# Patient Record
Sex: MALE | Race: WHITE
[De-identification: names, ages, dates, MRNs, and addresses within clinical notes are randomized per-mention and may not be internally consistent; named-entity substitution may affect disease eponyms.]

---

## 2017-02-09 ENCOUNTER — HOSPITAL ENCOUNTER (OUTPATIENT)
Dept: HOSPITAL 17 - HDIC | Age: 57
Discharge: HOME | End: 2017-02-09
Attending: SURGERY
Payer: MEDICAID

## 2017-02-09 VITALS
HEART RATE: 55 BPM | RESPIRATION RATE: 18 BRPM | SYSTOLIC BLOOD PRESSURE: 137 MMHG | OXYGEN SATURATION: 96 % | TEMPERATURE: 98.5 F | DIASTOLIC BLOOD PRESSURE: 84 MMHG

## 2017-02-09 VITALS — BODY MASS INDEX: 25.5 KG/M2 | WEIGHT: 188.27 LBS | HEIGHT: 72 IN

## 2017-02-09 DIAGNOSIS — I70.212: Primary | ICD-10-CM

## 2017-02-09 DIAGNOSIS — E78.5: ICD-10-CM

## 2017-02-09 DIAGNOSIS — I10: ICD-10-CM

## 2017-02-09 LAB
ANION GAP SERPL CALC-SCNC: 7 MEQ/L (ref 5–15)
APTT BLD: 26.6 SEC (ref 24.3–30.1)
BASOPHILS # BLD AUTO: 0.1 TH/MM3 (ref 0–0.2)
BASOPHILS NFR BLD: 0.7 % (ref 0–2)
BUN SERPL-MCNC: 20 MG/DL (ref 7–18)
CHLORIDE SERPL-SCNC: 102 MEQ/L (ref 98–107)
EOSINOPHIL # BLD: 0.2 TH/MM3 (ref 0–0.4)
EOSINOPHIL NFR BLD: 2.4 % (ref 0–4)
ERYTHROCYTE [DISTWIDTH] IN BLOOD BY AUTOMATED COUNT: 13.3 % (ref 11.6–17.2)
GFR SERPLBLD BASED ON 1.73 SQ M-ARVRAT: 47 ML/MIN (ref 89–?)
HCO3 BLD-SCNC: 28.5 MEQ/L (ref 21–32)
HCT VFR BLD CALC: 45.2 % (ref 39–51)
HEMO FLAGS: (no result)
INR PPP: 1 RATIO
LYMPHOCYTES # BLD AUTO: 1.7 TH/MM3 (ref 1–4.8)
LYMPHOCYTES NFR BLD AUTO: 22.2 % (ref 9–44)
MCH RBC QN AUTO: 31.5 PG (ref 27–34)
MCHC RBC AUTO-ENTMCNC: 36.2 % (ref 32–36)
MCV RBC AUTO: 87.1 FL (ref 80–100)
MONOCYTES NFR BLD: 5 % (ref 0–8)
NEUTROPHILS # BLD AUTO: 5.3 TH/MM3 (ref 1.8–7.7)
NEUTROPHILS NFR BLD AUTO: 69.7 % (ref 16–70)
PLAT MORPH BLD: NORMAL
PLATELET # BLD: 246 TH/MM3 (ref 150–450)
PLATELET BLD QL SMEAR: NORMAL
POTASSIUM SERPL-SCNC: 3.9 MEQ/L (ref 3.5–5.1)
PROTHROMBIN TIME: 11.4 SEC (ref 9.8–11.6)
RBC # BLD AUTO: 5.2 MIL/MM3 (ref 4.5–5.9)
SCAN/DIFF: (no result)
SODIUM SERPL-SCNC: 137 MEQ/L (ref 136–145)
WBC # BLD AUTO: 7.7 TH/MM3 (ref 4–11)

## 2017-02-09 PROCEDURE — 85025 COMPLETE CBC W/AUTO DIFF WBC: CPT

## 2017-02-09 PROCEDURE — 85730 THROMBOPLASTIN TIME PARTIAL: CPT

## 2017-02-09 PROCEDURE — 75710 ARTERY X-RAYS ARM/LEG: CPT

## 2017-02-09 PROCEDURE — 85610 PROTHROMBIN TIME: CPT

## 2017-02-09 PROCEDURE — 75625 CONTRAST EXAM ABDOMINL AORTA: CPT

## 2017-02-09 PROCEDURE — 80048 BASIC METABOLIC PNL TOTAL CA: CPT

## 2017-02-09 PROCEDURE — C1887 CATHETER, GUIDING: HCPCS

## 2017-02-09 PROCEDURE — C1725 CATH, TRANSLUMIN NON-LASER: HCPCS

## 2017-02-09 PROCEDURE — 37220: CPT

## 2017-02-09 PROCEDURE — C1893 INTRO/SHEATH, FIXED,NON-PEEL: HCPCS

## 2017-02-09 PROCEDURE — 85002 BLEEDING TIME TEST: CPT

## 2017-02-09 PROCEDURE — C1769 GUIDE WIRE: HCPCS

## 2017-02-09 NOTE — MA
cc:

PEGGY WEI

****

 

 

DATE: 02/09/2017

 

PREOPERATIVE DIAGNOSIS

Claudication left lower extremity, lifestyle-limiting.

 

POSTOPERATIVE DIAGNOSIS

Claudication left lower extremity, lifestyle-limiting.

 

PROCEDURE PERFORMED

1.   Aortogram.

2.   Selective left lower extremity arteriogram.

3.   Balloon angioplasty of the left common iliac artery with a

4x8 mm balloon.

 

PROCEDURE

I got access to the right common femoral artery using duplex ultrasound. I

placed a 21 gauge needle, exchanged over a manual wire for a 4-Mosotho

micropuncture catheter and exchanged for a 5-Mosotho sheath. I advanced an Omni

flush catheter over and into the abdominal aorta and shot AP aortogram. I

pulled my catheter out and shot pelvic oblique arteriograms.  I then selected

out the left common femoral artery.  It should be noted it was very difficult

in order to get across the origin of left common iliac artery.  I performed a

balloon angioplasty with a 4 x 8 mm balloon as there appeared to be some type

of dissection in this area.  I did heparinize the patient to an ACT greater

than 200.  My findings were that the abdominal aorta was widely patent.  There

was a little ectatic area of the distal abdominal aorta that could represent an

aneurysm. The right common, internal and external iliac arteries were widely

patent.  The left common iliac artery had an ectatic what appeared to be

possible calcified lesion with a moderate stenosis.  The patient had a painful

left hypogastric and external iliac artery as well as common and profunda

femoral arteries.  The left SFA was patent proximally but distally there was a

chronic total occlusion with reconstitution of the above-knee pop. The

popliteal artery gave rise to three-vessel runoff below the level of the knee,

although it should be noted that really distal to the chronic total occlusion

the popliteal artery I think had some scattered disease and I was unable to see

the left distal aspect of the leg. It should be noted that I did use a 6-Mosotho

45 cm destination sheath before ballooning the left common iliac artery and

then also used this to pressure measurements from the external iliac artery to

the distal abdominal aorta.  There was no pressure gradient after balloon

angioplasty.  I then I pulled the sheath out of the right groin and applied

pressure after the ACT was less than 200.

 

 

 

                              _________________________________

                              DO SCAR Leung/BENTLEY

D:  2/9/2017/1:55 PM

T:  2/9/2017/2:25 PM

Visit #:  [id number]

Job #:  [id number]

## 2017-03-09 ENCOUNTER — HOSPITAL ENCOUNTER (OUTPATIENT)
Dept: HOSPITAL 17 - ESDC | Age: 57
Discharge: HOME | End: 2017-03-09
Attending: ORTHOPAEDIC SURGERY
Payer: MEDICAID

## 2017-03-09 DIAGNOSIS — M94.262: ICD-10-CM

## 2017-03-09 DIAGNOSIS — S83.282A: ICD-10-CM

## 2017-03-09 DIAGNOSIS — M11.262: ICD-10-CM

## 2017-03-09 DIAGNOSIS — S83.242A: Primary | ICD-10-CM

## 2017-03-09 DIAGNOSIS — M67.52: ICD-10-CM

## 2017-03-09 PROCEDURE — 01400 ANES OPN/ARTHRS KNEE JT NOS: CPT

## 2017-03-09 PROCEDURE — 29880 ARTHRS KNE SRG MNISECTMY M&L: CPT

## 2017-03-09 NOTE — TN
cc:

MASON HOOD M.D.

****

 

 

DATE OF SURGERY

3/9/2017

 

PREOPERATIVE DIAGNOSIS

Left knee degenerative medial meniscus tear.

 

POSTOPERATIVE DIAGNOSIS

1.  Left knee medial and lateral meniscal tears.

2.  Left knee large inflamed  medial suprapatellar plica 

3.  Left knee hypertrophic synovitis patellar fat pad.

4.  Left knee  chondromalacia medial femoral condyle, grade 2B.

5.  Left knee chondrocalcinosis lateral compartment.

 

PROCEDURE PERFORMED

1.  Left knee arthroscopy with partial medial and lateral

     meniscectomies.

2.  Left knee synovectomy involving the hypertrophic patellar fat

      pad that was inflamed, as well as the large medial

      parapatellar plica.

3.  Chondroplasty medial femoral condyle.

 

SURGEON

Mason Hood MD

 

ANESTHESIA

General via laryngeal mask augmented by local infiltration

 

BLOOD LOSS

Minimal

 

FLUID REPLACEMENT

500 cc of crystalloid

 

TOURNIQUET TIME

The tourniquet was applied prophylactically but never inflated.

 

IMPLANTS

No implants were utilized.

 

COUNTS

All counts were correct.

 

 

 COMPLICATIONS

There were no intraoperative complications.

 

DRAINS

No drains were utilized.

 

INDICATIONS FOR PROCEDURE

Mr. Alberto is a 56-year-old gentleman who has a known left knee degenerative

medial meniscus tear.  He has been treated conservatively for a long period of

time with both injections, anti-inflammatories, activity modification and he

continues to have a great deal of discomfort and limited mobility.  He has 
partial

left-sided hemiparesis due to a previous stroke, but has had a fairly good

recovery and his left knee pain continues to be some of his most significant

limiting factors.  As a result of his persistent symptoms and failure of

conservative treatment, he is being taken to the operating room for a left knee

arthroscopy for debridement.  I had recognized that he had diminished pulses

in the left lower extremity and he has recently undergone vascular evaluation

by Dr. Arthur and on February 9, 2017 he underwent a balloon angioplasty of

the left common femoral iliac artery and has what appears to be a chronic

appearing occlusion of the superficial femoral artery.  As a result of the

recent procedures on the left leg, we are going to go ahead and make every

attempt possible to avoid the use of the tourniquet on the left thigh and we

will apply prophylactically and only use it in the event that there is bleeding

that prevents us from safely operating on the knee.  He was aware of the risks,

benefits and potential complications as well as limitations of the procedure

and full written informed consent was obtained.

 

DESCRIPTION OF PROCEDURE

The patient was verbally identified in the holding area.  He correctly marked

his left  knee and I had marked it as well.  He was then given one gram of

intravenous Ancef as prophylactic antibiotic.  He did have a 1+ palpable

dorsalis pedis and a trace palpable posterior tibial pulse in the holding area.

At this time, he was taken to the operating suite where he was placed under

general laryngeal mask anesthetic by Dr. Rivas and then had a well-padded

thigh-high tourniquet applied on his left leg, but it was only loosely applied

and was going to be there for urgent use only.  His left leg was prepped with

alcohol and Hibiclens and then he was draped in the normal standard fashion

including the use of an impervious stockinette from the foot all the way up to

the upper calf region.

 

 

At this time, a brief time-out was held confirming the left leg was the

appropriate surgical site.  The team was in agreement and the case was now

begun.  Standard anteromedial and anterolateral joint line portals were made

under direct vision.  Care was taken to inject them copiously with local

anesthetic with epinephrine and there was some bleeding from the lateral

portal, but very little bleeding was noted medially.  Once the scope was put 
through the

portal, the bleeding resolved and a small clear effusion was evacuated.   
Inflow was initiated and a

survey of the joint was as follows.  The suprapatellar pouch had a very large

thickened medial parapatellar plica that was inflamed and this was excised with

an oscillating shaver.  There were two areas of slight blood oozing and I went

ahead and used the underwater electrocautery to cauterize these two regions to

help prevent problems with postoperative hemearthrosis.  He will be placed on 
Plavix

postoperatively so we nbeed meticulous hemostasis.  At this time, the patella 
was seen

to track centrally throughout a range of motion.  There was very little

chondromalacia appreciated on the trochlear or the patellar side.  The medial

compartment was now inspected.  The medial gutter was free of any loose bodies

or synovitis.  As I entered the medial compartment, there was a tear seen at

the anterior third of the medial meniscus which was debrided with an

oscillating shaver.  There was also a classic degenerative tear seen on the

posterior third of the medial meniscus.  Both were debrided with an oscillating

shaver and were able to be contoured.  The remnant

was fully probed and was quite stable and did not sublux into the joint after 
partial meniscectomy was done.

 

At this time, the intercondylar notch was now explored.  The anterior cruciate

ligament was seen to be intact and tensioned appropriately with anterior

drawer.  The lateral compartment was now entered.  There was evidence of a tear

seen on the posterior horn of the lateral meniscus, as well as two small radial

tears seen at the junction of the posterior third and mid third of the lateral

meniscus.  I went ahead and trimmed back both of those areas in order to

eliminate the radial tear and to prevent it from turning into a larger tear in

the future.  There was stable calcification appreciated on the femoral condyle,

the lateral tibial plateau and the lateral meniscus consistent with

chondrocalcinosis likely potentially related to his numerous injections he has

had in the past. The lateral gutter had no loose bodies or synovitis.

 

The knee was suctioned decompressed and I thoroughly inspected all regions once

again, especially with the plica was excised and there is no evidence of any

active bleeding.  No further pathology was identified.  The wounds were then

closed with 3-0 nylon simple sutures and please note I used the electrocautery

to cauterize a very small superficial bleeder that was near the lateral

arthroscopy portal.  Care was taken to retract the skin edges to avoid skin

burning.  The portals were then closed with 3-0 nylon simple sutures and then

30 cc of quarter percent Marcaine with epinephrine were then infiltrated around

the portals themselves as well as intraarticularly for further postop pain

relief and for further hemostasis.  Xeroform, 4x4s, ABD's and Ace wrap was

applied.

 

He was awoken from anesthesia and taken to recovery in stable condition.

Appropriate postoperative orders have been written.

 

Please note, he did have 1+ palpable dorsalis pedis pulse in the holding area.

 

 

 

                                     ****Mason Hood MD****

                                    ****Electronically Signed****

                              _________________________________

                              MD CATHERINE Blunt/OCTAVIA

D:  3/9/2017/11:04 AM

T:  3/9/2017/11:19 AM

Visit #:  [id number]

Job #:  [id number]

ANA LAURA

## 2017-08-21 ENCOUNTER — HOSPITAL ENCOUNTER (OUTPATIENT)
Dept: HOSPITAL 17 - HDOC | Age: 57
Discharge: HOME | End: 2017-08-21
Attending: SURGERY
Payer: MEDICAID

## 2017-08-21 VITALS — BODY MASS INDEX: 24.81 KG/M2 | HEIGHT: 72 IN | WEIGHT: 183.2 LBS

## 2017-08-21 VITALS
HEART RATE: 66 BPM | RESPIRATION RATE: 16 BRPM | SYSTOLIC BLOOD PRESSURE: 117 MMHG | DIASTOLIC BLOOD PRESSURE: 74 MMHG | OXYGEN SATURATION: 95 % | TEMPERATURE: 97.8 F

## 2017-08-21 DIAGNOSIS — I10: ICD-10-CM

## 2017-08-21 DIAGNOSIS — I73.9: Primary | ICD-10-CM

## 2017-08-21 DIAGNOSIS — E78.5: ICD-10-CM

## 2017-08-21 LAB
ANION GAP SERPL CALC-SCNC: 8 MEQ/L (ref 5–15)
BASOPHILS # BLD AUTO: 0 TH/MM3 (ref 0–0.2)
BASOPHILS NFR BLD: 0.6 % (ref 0–2)
BUN SERPL-MCNC: 18 MG/DL (ref 7–18)
CHLORIDE SERPL-SCNC: 101 MEQ/L (ref 98–107)
EOSINOPHIL # BLD: 0.3 TH/MM3 (ref 0–0.4)
EOSINOPHIL NFR BLD: 4.4 % (ref 0–4)
ERYTHROCYTE [DISTWIDTH] IN BLOOD BY AUTOMATED COUNT: 13.4 % (ref 11.6–17.2)
GFR SERPLBLD BASED ON 1.73 SQ M-ARVRAT: 55 ML/MIN (ref 89–?)
HCO3 BLD-SCNC: 26.8 MEQ/L (ref 21–32)
HCT VFR BLD CALC: 44.9 % (ref 39–51)
HEMO FLAGS: (no result)
INR PPP: 1 RATIO
LYMPHOCYTES # BLD AUTO: 1.6 TH/MM3 (ref 1–4.8)
LYMPHOCYTES NFR BLD AUTO: 24.9 % (ref 9–44)
MCH RBC QN AUTO: 30.3 PG (ref 27–34)
MCHC RBC AUTO-ENTMCNC: 34.6 % (ref 32–36)
MCV RBC AUTO: 87.6 FL (ref 80–100)
MONOCYTES NFR BLD: 5.9 % (ref 0–8)
NEUTROPHILS # BLD AUTO: 4.2 TH/MM3 (ref 1.8–7.7)
NEUTROPHILS NFR BLD AUTO: 64.2 % (ref 16–70)
PLATELET # BLD: 233 TH/MM3 (ref 150–450)
POTASSIUM SERPL-SCNC: 4.2 MEQ/L (ref 3.5–5.1)
PROTHROMBIN TIME: 10.7 SEC (ref 9.8–11.6)
RBC # BLD AUTO: 5.12 MIL/MM3 (ref 4.5–5.9)
SODIUM SERPL-SCNC: 136 MEQ/L (ref 136–145)
WBC # BLD AUTO: 6.6 TH/MM3 (ref 4–11)

## 2017-08-21 PROCEDURE — 36245 INS CATH ABD/L-EXT ART 1ST: CPT

## 2017-08-21 PROCEDURE — 93005 ELECTROCARDIOGRAM TRACING: CPT

## 2017-08-21 PROCEDURE — G0269 OCCLUSIVE DEVICE IN VEIN ART: HCPCS

## 2017-08-21 PROCEDURE — 75625 CONTRAST EXAM ABDOMINL AORTA: CPT

## 2017-08-21 PROCEDURE — C1769 GUIDE WIRE: HCPCS

## 2017-08-21 PROCEDURE — 85025 COMPLETE CBC W/AUTO DIFF WBC: CPT

## 2017-08-21 PROCEDURE — 80048 BASIC METABOLIC PNL TOTAL CA: CPT

## 2017-08-21 PROCEDURE — 85610 PROTHROMBIN TIME: CPT

## 2017-08-21 PROCEDURE — 75710 ARTERY X-RAYS ARM/LEG: CPT

## 2017-08-21 NOTE — EKG
Date Performed: 08/21/2017       Time Performed: 10:33:04

 

PTAGE:      56 years

 

EKG:      Sinus rhythm 

 

. Normal ECG

 

PREVIOUS TRACING       : 07/02/2013 06.07 No significant change from previous tracing noted.

 

DOCTOR:   Andrew Whittington  Interpretating Date/Time  08/21/2017 13:44:11

## 2017-08-21 NOTE — MA
cc:

PEGGY WEI

****

 

 

DATE: 08/21/2017

 

PREOPERATIVE DIAGNOSIS

History of claudication versus pseudoclaudication left lower extremity.

 

SURGEON

Dr. Wei.

 

PROCEDURE

Aortogram, pelvic arteriogram and selective left lower extremity arteriogram.

 

PROCEDURE

The patient's bilateral groins were prepped and draped in sterile fashion after

being under moderate sedation.  I used approximately 5 ccs of 1% lidocaine via

the left groin in order to anesthetize the area.  I used 21 gauge needle under

ultrasound and then I exchanged for a 4-Palauan micropuncture catheter and then

a 5-Palauan sheath using Seldinger technique. I advanced an Omni flush catheter

in abdominal aorta, shot an AP aortogram.  I pulled my pick catheter down into

the pelvis, shot pelvic oblique arteriograms.  I then shot a selective left

lower extremity arteriogram.  My findings were that the abdominal aorta was

widely patent.  There were bilateral renal arteries on the left side.  At least

a moderate narrowing at the origin of left renal artery.  The right renal

artery was widely patent.  The right common internal and external iliac

arteries appeared to be widely patent.  The left common iliac artery had an

aneurysmal saccular dilatation with an area of what appeared to be narrowing

just distal to the takeoff of the left common iliac artery.  The left

hypogastric and external iliac arteries were widely patent.  The right common

iliac, internal and external iliac arteries were widely patent. The left common

femoral and profunda femoral artery was widely patent.  The left superficial

femoral artery had some multifocal disease but nothing that appeared to be

flow-limiting.  The left popliteal artery was widely patent.  There was a what

appeared to be a high-grade stenosis versus a chronic total occlusion of the

takeoff at the tibioperoneal trunk with reconstitution distally.  The patient

had two-vessel runoff through the anterior tibial artery and posterior tibial

artery. It should be noted that the posterior tibial artery was a dominant

vessel that crossed the level of the ankle.  At the end of the procedure we

removed the sheath and exchanged for a 6-Palauan Angio-Seal, applied pressure to

the left groin.  The patient had no hematoma.  The patient tolerated the

procedure well and was taken to PACU at the end of the case.

 

 

                              _________________________________

                              DO SCAR Leung/BENTLEY

D:  8/21/2017/1:18 PM

T:  8/21/2017/1:38 PM

Visit #:  [id number]

Job #:  [id number]

## 2017-09-15 ENCOUNTER — HOSPITAL ENCOUNTER (OUTPATIENT)
Dept: HOSPITAL 17 - CPRE | Age: 57
End: 2017-09-15
Attending: SURGERY
Payer: MEDICAID

## 2017-09-15 DIAGNOSIS — Z01.810: Primary | ICD-10-CM

## 2017-09-15 DIAGNOSIS — Z01.811: ICD-10-CM

## 2017-09-15 DIAGNOSIS — I73.9: ICD-10-CM

## 2017-09-15 DIAGNOSIS — Z01.812: ICD-10-CM

## 2017-09-15 LAB
ANION GAP SERPL CALC-SCNC: 6 MEQ/L (ref 5–15)
BUN SERPL-MCNC: 9 MG/DL (ref 7–18)
CHLORIDE SERPL-SCNC: 104 MEQ/L (ref 98–107)
ERYTHROCYTE [DISTWIDTH] IN BLOOD BY AUTOMATED COUNT: 13.7 % (ref 11.6–17.2)
GFR SERPLBLD BASED ON 1.73 SQ M-ARVRAT: 59 ML/MIN (ref 89–?)
HCO3 BLD-SCNC: 26.6 MEQ/L (ref 21–32)
HCT VFR BLD CALC: 45.8 % (ref 39–51)
MCH RBC QN AUTO: 30.7 PG (ref 27–34)
MCHC RBC AUTO-ENTMCNC: 34.7 % (ref 32–36)
MCV RBC AUTO: 88.5 FL (ref 80–100)
PLATELET # BLD: 249 TH/MM3 (ref 150–450)
POTASSIUM SERPL-SCNC: 4.5 MEQ/L (ref 3.5–5.1)
RBC # BLD AUTO: 5.17 MIL/MM3 (ref 4.5–5.9)
REVIEW FLAG: (no result)
SODIUM SERPL-SCNC: 137 MEQ/L (ref 136–145)
WBC # BLD AUTO: 8.7 TH/MM3 (ref 4–11)

## 2017-09-15 PROCEDURE — 93005 ELECTROCARDIOGRAM TRACING: CPT

## 2017-09-15 PROCEDURE — 85027 COMPLETE CBC AUTOMATED: CPT

## 2017-09-15 PROCEDURE — 36415 COLL VENOUS BLD VENIPUNCTURE: CPT

## 2017-09-15 PROCEDURE — 80048 BASIC METABOLIC PNL TOTAL CA: CPT

## 2017-09-15 PROCEDURE — 71020: CPT

## 2017-09-15 NOTE — RADRPT
EXAM DATE/TIME:  09/15/2017 12:05 

 

[facility] COMPARISON:     

No previous studies available for comparison.

 

                     

INDICATIONS :     

Evaluate for pneumonia, pneumothorax or communicable disease. Pre op fro femoral bypass.

                     

 

MEDICAL HISTORY :     

Hypertension.  Arthritis.     CVA.   

 

SURGICAL HISTORY :        

Pelvis. Bilateral knees.

 

ENCOUNTER:     

Initial                                        

 

ACUITY:     

1 day      

 

PAIN SCORE:     

0/10

 

LOCATION:      

chest 

 

FINDINGS:     

PA and lateral views of the chest demonstrate the lungs to be symmetrically aerated without evidence 
of mass, infiltrate or effusion.  The cardiomediastinal contours are unremarkable. Mild degenerative 
changes are noted throughout the thoraco-lumbar spine.

 

CONCLUSION:     

1. No acute cardiopulmonary disease. 

2. Mild degenerative changes involving the thoraco-lumbar spine. 

 

 

 German Negron MD on September 15, 2017 at 12:40           

Board Certified Radiologist.

 This report was verified electronically.

## 2017-09-15 NOTE — EKG
Date Performed: 09/15/2017       Time Performed: 10:50:12

 

PTAGE:      57 years

 

EKG:      Sinus rhythm 

 

 NORMAL ECG No significant change from prior electrocardiogram. 

 

NO PREVIOUS TRACING            

 

DOCTOR:   Miguel Regalado  Interpretating Date/Time  09/15/2017 13:35:50

## 2017-09-19 ENCOUNTER — HOSPITAL ENCOUNTER (INPATIENT)
Dept: HOSPITAL 17 - HSDI | Age: 57
LOS: 7 days | Discharge: NURSING, REHAB OR LONG-TERM CARE | DRG: 254 | End: 2017-09-26
Attending: SURGERY | Admitting: SURGERY
Payer: MEDICAID

## 2017-09-19 VITALS
SYSTOLIC BLOOD PRESSURE: 125 MMHG | HEART RATE: 88 BPM | RESPIRATION RATE: 16 BRPM | DIASTOLIC BLOOD PRESSURE: 70 MMHG | TEMPERATURE: 97.6 F | OXYGEN SATURATION: 99 %

## 2017-09-19 VITALS
OXYGEN SATURATION: 99 % | TEMPERATURE: 97.9 F | RESPIRATION RATE: 16 BRPM | HEART RATE: 91 BPM | SYSTOLIC BLOOD PRESSURE: 141 MMHG | DIASTOLIC BLOOD PRESSURE: 83 MMHG

## 2017-09-19 VITALS
RESPIRATION RATE: 16 BRPM | TEMPERATURE: 97.7 F | HEART RATE: 87 BPM | DIASTOLIC BLOOD PRESSURE: 73 MMHG | SYSTOLIC BLOOD PRESSURE: 137 MMHG | OXYGEN SATURATION: 97 %

## 2017-09-19 VITALS — WEIGHT: 184.09 LBS | HEIGHT: 72 IN | BODY MASS INDEX: 24.93 KG/M2

## 2017-09-19 VITALS — HEART RATE: 89 BPM

## 2017-09-19 DIAGNOSIS — Z96.659: ICD-10-CM

## 2017-09-19 DIAGNOSIS — I73.9: Primary | ICD-10-CM

## 2017-09-19 PROCEDURE — 041L09L BYPASS LEFT FEMORAL ARTERY TO POPLITEAL ARTERY WITH AUTOLOGOUS VENOUS TISSUE, OPEN APPROACH: ICD-10-PCS | Performed by: SURGERY

## 2017-09-19 PROCEDURE — 86850 RBC ANTIBODY SCREEN: CPT

## 2017-09-19 PROCEDURE — 06BQ0ZZ EXCISION OF LEFT SAPHENOUS VEIN, OPEN APPROACH: ICD-10-PCS | Performed by: SURGERY

## 2017-09-19 PROCEDURE — 82948 REAGENT STRIP/BLOOD GLUCOSE: CPT

## 2017-09-19 PROCEDURE — 86900 BLOOD TYPING SEROLOGIC ABO: CPT

## 2017-09-19 PROCEDURE — 80053 COMPREHEN METABOLIC PANEL: CPT

## 2017-09-19 PROCEDURE — 94150 VITAL CAPACITY TEST: CPT

## 2017-09-19 PROCEDURE — 93971 EXTREMITY STUDY: CPT

## 2017-09-19 PROCEDURE — 76937 US GUIDE VASCULAR ACCESS: CPT

## 2017-09-19 PROCEDURE — 86901 BLOOD TYPING SEROLOGIC RH(D): CPT

## 2017-09-19 RX ADMIN — PANTOPRAZOLE SCH MG: 40 TABLET, DELAYED RELEASE ORAL at 21:27

## 2017-09-19 RX ADMIN — MORPHINE SULFATE PRN MG: 2 INJECTION, SOLUTION INTRAMUSCULAR; INTRAVENOUS at 19:13

## 2017-09-19 RX ADMIN — CEFAZOLIN SODIUM SCH MLS/HR: 2 SOLUTION INTRAVENOUS at 23:01

## 2017-09-19 RX ADMIN — MORPHINE SULFATE PRN MG: 2 INJECTION, SOLUTION INTRAMUSCULAR; INTRAVENOUS at 23:01

## 2017-09-19 RX ADMIN — DOCUSATE CALCIUM SCH MG: 240 CAPSULE, LIQUID FILLED ORAL at 21:00

## 2017-09-19 RX ADMIN — PHENYTOIN SODIUM SCH MLS/HR: 50 INJECTION INTRAMUSCULAR; INTRAVENOUS at 17:22

## 2017-09-19 NOTE — HHI.PR
__________________________________________________





Immediate Post Op Note


Procedure Date:


Sep 19, 2017


Pre Op Diagnosis:  


(1) Claudication in peripheral vascular disease


Post Op Diagnosis:  


(1) Claudication in peripheral vascular disease


Surgeon:


Yvon Arthur


Assistant(s):


Bryson Vincent


Procedure:


Left fem-pop bypass with reversed SVG.


Findings:


strong signal after bypass in left DP and PT.


Additional Information:


NA


Complications:


NA


Specimen(s) removed:


NA


Estimated blood loss:


150cc


Anesthesia:  General


Drains:  None


Fluids:  2.5 liters


IVF


Tourniquet time (min at mmHg)


NA


Patient to:  PACU


Patient Condition:  Good


Implant/Devices:  SEE IMPLANT LOG (if applicable)


Date/Time of Procedure:  SEE SURGICAL CARE RECORD











Yvon Arthur DO Sep 19, 2017 15:45

## 2017-09-20 VITALS
OXYGEN SATURATION: 98 % | DIASTOLIC BLOOD PRESSURE: 71 MMHG | RESPIRATION RATE: 16 BRPM | SYSTOLIC BLOOD PRESSURE: 150 MMHG | TEMPERATURE: 98.6 F | HEART RATE: 76 BPM

## 2017-09-20 VITALS — HEART RATE: 86 BPM

## 2017-09-20 VITALS — HEART RATE: 80 BPM

## 2017-09-20 VITALS
TEMPERATURE: 98.9 F | SYSTOLIC BLOOD PRESSURE: 131 MMHG | DIASTOLIC BLOOD PRESSURE: 68 MMHG | RESPIRATION RATE: 17 BRPM | OXYGEN SATURATION: 100 % | HEART RATE: 84 BPM

## 2017-09-20 VITALS
SYSTOLIC BLOOD PRESSURE: 166 MMHG | RESPIRATION RATE: 16 BRPM | OXYGEN SATURATION: 98 % | HEART RATE: 89 BPM | DIASTOLIC BLOOD PRESSURE: 77 MMHG

## 2017-09-20 VITALS
SYSTOLIC BLOOD PRESSURE: 126 MMHG | OXYGEN SATURATION: 98 % | RESPIRATION RATE: 16 BRPM | TEMPERATURE: 98.6 F | HEART RATE: 80 BPM | DIASTOLIC BLOOD PRESSURE: 67 MMHG

## 2017-09-20 VITALS — HEART RATE: 90 BPM

## 2017-09-20 VITALS
DIASTOLIC BLOOD PRESSURE: 75 MMHG | OXYGEN SATURATION: 99 % | SYSTOLIC BLOOD PRESSURE: 134 MMHG | RESPIRATION RATE: 17 BRPM | HEART RATE: 89 BPM | TEMPERATURE: 98.6 F

## 2017-09-20 VITALS
RESPIRATION RATE: 18 BRPM | DIASTOLIC BLOOD PRESSURE: 74 MMHG | TEMPERATURE: 98.2 F | HEART RATE: 76 BPM | OXYGEN SATURATION: 97 % | SYSTOLIC BLOOD PRESSURE: 127 MMHG

## 2017-09-20 VITALS — HEART RATE: 78 BPM

## 2017-09-20 VITALS — OXYGEN SATURATION: 98 %

## 2017-09-20 VITALS
SYSTOLIC BLOOD PRESSURE: 116 MMHG | TEMPERATURE: 98.7 F | RESPIRATION RATE: 16 BRPM | DIASTOLIC BLOOD PRESSURE: 81 MMHG | HEART RATE: 84 BPM | OXYGEN SATURATION: 97 %

## 2017-09-20 VITALS — HEART RATE: 85 BPM

## 2017-09-20 VITALS — HEART RATE: 82 BPM

## 2017-09-20 VITALS — HEART RATE: 84 BPM

## 2017-09-20 VITALS — HEART RATE: 88 BPM

## 2017-09-20 LAB
ALP SERPL-CCNC: 81 U/L (ref 45–117)
ALT SERPL-CCNC: 28 U/L (ref 12–78)
ANION GAP SERPL CALC-SCNC: 7 MEQ/L (ref 5–15)
AST SERPL-CCNC: 13 U/L (ref 15–37)
BILIRUB SERPL-MCNC: 0.2 MG/DL (ref 0.2–1)
BUN SERPL-MCNC: 16 MG/DL (ref 7–18)
CHLORIDE SERPL-SCNC: 105 MEQ/L (ref 98–107)
GFR SERPLBLD BASED ON 1.73 SQ M-ARVRAT: 56 ML/MIN (ref 89–?)
HCO3 BLD-SCNC: 26.2 MEQ/L (ref 21–32)
POTASSIUM SERPL-SCNC: 4.2 MEQ/L (ref 3.5–5.1)
SODIUM SERPL-SCNC: 138 MEQ/L (ref 136–145)

## 2017-09-20 RX ADMIN — MORPHINE SULFATE PRN MG: 2 INJECTION, SOLUTION INTRAMUSCULAR; INTRAVENOUS at 08:19

## 2017-09-20 RX ADMIN — ATORVASTATIN CALCIUM SCH MG: 40 TABLET, FILM COATED ORAL at 21:13

## 2017-09-20 RX ADMIN — MORPHINE SULFATE PRN MG: 2 INJECTION, SOLUTION INTRAMUSCULAR; INTRAVENOUS at 23:41

## 2017-09-20 RX ADMIN — CEFAZOLIN SODIUM SCH MLS/HR: 2 SOLUTION INTRAVENOUS at 15:41

## 2017-09-20 RX ADMIN — MORPHINE SULFATE PRN MG: 2 INJECTION, SOLUTION INTRAMUSCULAR; INTRAVENOUS at 14:00

## 2017-09-20 RX ADMIN — MORPHINE SULFATE PRN MG: 2 INJECTION, SOLUTION INTRAMUSCULAR; INTRAVENOUS at 19:36

## 2017-09-20 RX ADMIN — DOCUSATE CALCIUM SCH MG: 240 CAPSULE, LIQUID FILLED ORAL at 21:13

## 2017-09-20 RX ADMIN — PHENYTOIN SODIUM SCH MLS/HR: 50 INJECTION INTRAMUSCULAR; INTRAVENOUS at 14:00

## 2017-09-20 RX ADMIN — PHENYTOIN SODIUM SCH MLS/HR: 50 INJECTION INTRAMUSCULAR; INTRAVENOUS at 01:40

## 2017-09-20 RX ADMIN — ASPIRIN SCH MG: 325 TABLET, DELAYED RELEASE ORAL at 08:16

## 2017-09-20 RX ADMIN — PANTOPRAZOLE SCH MG: 40 TABLET, DELAYED RELEASE ORAL at 21:13

## 2017-09-20 RX ADMIN — CEFAZOLIN SODIUM SCH MLS/HR: 2 SOLUTION INTRAVENOUS at 06:19

## 2017-09-20 RX ADMIN — MORPHINE SULFATE PRN MG: 2 INJECTION, SOLUTION INTRAMUSCULAR; INTRAVENOUS at 03:52

## 2017-09-20 NOTE — MP
cc:

PEGGY ARTHUR

****

 

 

DATE OF SURGERY:  09/19/2017

 

PREOPERATIVE DIAGNOSIS

Short distance worsening claudication left lower extremity with intermittent

rest pain.

 

POSTOPERATIVE DIAGNOSIS

Short distance worsening claudication left lower extremity with intermittent

rest pain.

 

PROCEDURE

Left lower extremity femoral to below-knee popliteal vein bypass with reversed

spliced saphenous vein.

 

SURGEON

Dr. Arthur.

 

ASSISTANT

Bryson Vincent.

 

ANESTHESIA

General.

 

IV FLUIDS

2500 ccs.

 

ESTIMATED BLOOD LOSS

150.

 

URINE OUTPUT

600 ccs.

 

COMPLICATIONS

None.

 

DISPOSITION

To PACU.

 

PROCEDURE

The patient was prepped and draped in sterile fashion from the umbilicus to the

toes bilaterally.  We did give the patient 2 grams of IV Ancef after being

under general anesthesia.  I made an incision over the left groin which was

preoperatively in the medial third vertically with a scalpel and

electrocautery. I dissected down to the proximal left superficial femoral

artery.  I dissected it free of any periadventitial tissue and placed vessel

loops around it proximally and distally.  I then used a scalpel and Mills

scissors to make a counter incision that  started just above the level of the

ankle, proximally dissected out the left greater saphenous vein.  It should be

noted that there was a venotomy made in the vein around the level of the knee.

All branches were taken with small, medium clips and 4-0 and 2-0 ties as

needed.  In the groin I divided the vein at the saphenofemoral junction with a

5-0 Prolene and BB1 with a Satinsky clamp.  I divided it at the level of the

ankle.  I used two 2-0 Silk sutures.  There was a longitudinal tear in the vein

around the level of the knee that the vein was divided in a beveled manner and

anastomosed it together with 6-0 Prolene stitch in a running fashion.

 

It should be noted that any areas of bleeding were taken care of with 6-0

Prolene and small clips as needed.  Afterwards the vein appeared to dilate

appropriately as it was reversed and injected with normal saline.  I then went

through my previous incision for my vein harvest site to get out the below-knee

popliteal artery.  I dissected out with electrocautery and retracted the

gastrocnemius tendons posteriorly.  Once I found the below-knee popliteal

artery I isolated with vessel loops.  It should be

noted that the patient had a fairly thin leg and I felt like there might be

some approximation issues of the skin leaving the bypass in the subcutaneous

space, so I tunneled below the sartorius muscle. After I tunneled the vein I

did give heparin to an ACT of greater than 300.  I then performed proximal

anastomosis in end-to-side fashion using the pediatric profunda clamps to

control my proximal superficial femoral artery.

 

Afterwards it should be noted that there was a good pulsation in the bypass.  I

did use Jayde and Surgicel to help out with hemostasis.  I then performed my

distal anastomosis in  end-to-side fashion to the below-knee popliteal artery.

I performed arteriotomy with 11-blade after getting control of the proximal

distal popliteal artery with pediatric profunda clamps.

After I performed my bypass it should be noted that there was an augmented

signal in the dorsalis pedis and the posterior tibial distribution. At the end

of the case I did reverse my heparin with protamine and then I closed in layers

with 2-0 and 3-0 Vicryl absorbable sutures and a 4-0 Monocryl for the skin with

Dermabond.  The patient tolerated the procedure well and was

taken to PACU at the end of the case.

 

 

 

                              _________________________________

                              DO SCAR Leung/BENTLEY

D:  9/19/2017/3:30 PM

T:  9/20/2017/9:03 AM

Visit #:  [id number]

Job #:  [id number]

## 2017-09-20 NOTE — PD.VS.PN
Subjective


POD #:  1


Procedure(s):  


Left fem-pop bypass with reversed SVG.


Subjective/Hospital Course


Afebrile 57/M


C/o mild discomfort to L LE (last night)


Pain controlled 


Henley intact 


B LE warm w/ motor intact 


 (Vicki Johnson)





Objective


Vitals/I&O











  Date Time  Temp Pulse Resp B/P (MAP) Pulse Ox O2 Delivery O2 Flow Rate FiO2


 


9/20/17 08:55  89 16 166/77 (106) 98   


 


9/20/17 08:17     98   21


 


9/20/17 07:00     98 Room Air  


 


9/20/17 07:00  76      


 


9/20/17 07:00 98.6 83 16 150/71 (97) 98   


 


9/20/17 03:40     97 Room Air  


 


9/20/17 03:40 98.2 76 18 127/74 (91) 97   


 


9/20/17 03:25  84      


 


9/20/17 00:00  86      


 


9/19/17 23:36 97.7 87 16 137/73 (94) 97   


 


9/19/17 23:36     97 Room Air  


 


9/19/17 19:18  89      


 


9/19/17 19:15 97.6 88 16 125/70 (88) 99   


 


9/19/17 19:15     99 Nasal Cannula 2.00 


 


9/19/17 17:56 97.9 91 16 141/83 (102) 99   





    Arterial Line    


 


9/19/17 17:56     99 Nasal Cannula 2.00 


 


9/19/17 17:45  79 16 124/62 (82) 100 Nasal Cannula 2 


 


9/19/17 17:30  82 16 132/67 (88) 100 Nasal Cannula 2 


 


9/19/17 17:15  82 16 146/77 (100) 100 Nasal Cannula 2 





    159/78 (105)    


 


9/19/17 17:00  78 16 131/75 (93) 100 Nasal Cannula 2 





    143/66 (91)    


 


9/19/17 16:45  76 16 136/82 (100) 100 Nasal Cannula 2 





    144/69 (94)    


 


9/19/17 16:30  75 16 129/78 (95) 100 Nasal Cannula 2 





    148/71 (96)    


 


9/19/17 16:15  74 16 125/74 (91) 98 Nasal Cannula 2 





    143/70 (94)    


 


9/19/17 16:05 97.4 74 16 125/73 (90) 100 Nasal Cannula 2 














 9/20/17 9/20/17 9/20/17





 07:00 15:00 23:00


 


Intake Total 1876 ml 676 ml 


 


Output Total 2150 ml 775 ml 


 


Balance -274 ml -99 ml 








Exam:


GENERAL: 57/afebrile/W/M /GCS 15 /NAD /A&OX3


SKIN: Warm and dry. Left LE incision intact with surgical glue.  NO D/R/S/ Left 

groin incision intact w/o swelling or hematoma


CARDIOVASCULAR: RRR/ +S1,S2


RESPIRATORY: BS CTA 


GASTROINTESTINAL: 


S/NT- Left lower pelvic region with a hernia present 


(Chronic swelling from L pelvic region to left testicle)  


MUSCULOSKELETAL: No cyanosis, or edema. B LE warm with motor intact


Left DP/PT palpable


Right DP palpable


Laboratory





Laboratory Tests








Test


  9/20/17


06:16


 


Blood Urea Nitrogen 16 


 


Creatinine 1.31 


 


Random Glucose 96 


 


Total Protein 6.5 


 


Albumin 3.3 


 


Calcium Level 8.4 


 


Alkaline Phosphatase 81 


 


Aspartate Amino Transf


(AST/SGOT) 13 


 


 


Alanine Aminotransferase


(ALT/SGPT) 28 


 


 


Total Bilirubin 0.2 


 


Sodium Level 138 


 


Potassium Level 4.2 


 


Chloride Level 105 


 


Carbon Dioxide Level 26.2 


 


Anion Gap 7 


 


Estimat Glomerular Filtration


Rate 56 


 








 (Vicki Johnson)





Assessment and Plan


Assessment:  


(1) Claudication in peripheral vascular disease


Plan


57/M S/P Left fem-pop bypass- POD 1


Pt doing well w/o complications


Pt with mild discomfort to L LE (incisional) 


Pt with improved perfusion to L LE as patient has palpable L DP/PT 


B LE Warm w/ motor intact





Plan


D/C MIVF


D/C Henley cath (call if patient has not voided after 6H)


PT/OOB


Pain Control





Vicki URBINA


[facility]


[phone]


Discharge Planning


2-3 days 


 (Vicki Johnson)


Plan


I agree with above A/P.


Patient progressing appropriately.


Plan for Mobilization wed.


DC planning back SNF late week.





Yvon Arthur DO, FACS


 (Yvon Arthur DO)











Vicki Johnson Sep 20, 2017 10:02


Yvon Arthur DO Sep 20, 2017 16:56

## 2017-09-21 VITALS — HEART RATE: 92 BPM

## 2017-09-21 VITALS
SYSTOLIC BLOOD PRESSURE: 117 MMHG | OXYGEN SATURATION: 96 % | HEART RATE: 89 BPM | RESPIRATION RATE: 18 BRPM | TEMPERATURE: 98.1 F | DIASTOLIC BLOOD PRESSURE: 71 MMHG

## 2017-09-21 VITALS
TEMPERATURE: 98.9 F | DIASTOLIC BLOOD PRESSURE: 85 MMHG | HEART RATE: 92 BPM | OXYGEN SATURATION: 96 % | RESPIRATION RATE: 18 BRPM | SYSTOLIC BLOOD PRESSURE: 149 MMHG

## 2017-09-21 VITALS — HEART RATE: 88 BPM

## 2017-09-21 VITALS
TEMPERATURE: 99.1 F | OXYGEN SATURATION: 94 % | SYSTOLIC BLOOD PRESSURE: 141 MMHG | RESPIRATION RATE: 18 BRPM | DIASTOLIC BLOOD PRESSURE: 72 MMHG | HEART RATE: 82 BPM

## 2017-09-21 VITALS
DIASTOLIC BLOOD PRESSURE: 68 MMHG | TEMPERATURE: 98.4 F | SYSTOLIC BLOOD PRESSURE: 108 MMHG | OXYGEN SATURATION: 96 % | HEART RATE: 91 BPM | RESPIRATION RATE: 18 BRPM

## 2017-09-21 VITALS — HEART RATE: 95 BPM

## 2017-09-21 VITALS
HEART RATE: 79 BPM | TEMPERATURE: 98.4 F | SYSTOLIC BLOOD PRESSURE: 146 MMHG | OXYGEN SATURATION: 94 % | DIASTOLIC BLOOD PRESSURE: 83 MMHG | RESPIRATION RATE: 18 BRPM

## 2017-09-21 VITALS
HEART RATE: 91 BPM | DIASTOLIC BLOOD PRESSURE: 82 MMHG | OXYGEN SATURATION: 97 % | TEMPERATURE: 99 F | SYSTOLIC BLOOD PRESSURE: 133 MMHG | RESPIRATION RATE: 17 BRPM

## 2017-09-21 VITALS — HEART RATE: 82 BPM

## 2017-09-21 VITALS — HEART RATE: 84 BPM

## 2017-09-21 VITALS — HEART RATE: 102 BPM

## 2017-09-21 VITALS — HEART RATE: 96 BPM

## 2017-09-21 VITALS — HEART RATE: 79 BPM

## 2017-09-21 RX ADMIN — PANTOPRAZOLE SCH MG: 40 TABLET, DELAYED RELEASE ORAL at 22:14

## 2017-09-21 RX ADMIN — MORPHINE SULFATE PRN MG: 2 INJECTION, SOLUTION INTRAMUSCULAR; INTRAVENOUS at 08:05

## 2017-09-21 RX ADMIN — MORPHINE SULFATE PRN MG: 2 INJECTION, SOLUTION INTRAMUSCULAR; INTRAVENOUS at 03:46

## 2017-09-21 RX ADMIN — LISINOPRIL SCH MG: 20 TABLET ORAL at 08:04

## 2017-09-21 RX ADMIN — ASPIRIN SCH MG: 325 TABLET, DELAYED RELEASE ORAL at 08:04

## 2017-09-21 RX ADMIN — MORPHINE SULFATE PRN MG: 2 INJECTION, SOLUTION INTRAMUSCULAR; INTRAVENOUS at 13:27

## 2017-09-21 RX ADMIN — ATORVASTATIN CALCIUM SCH MG: 40 TABLET, FILM COATED ORAL at 22:14

## 2017-09-21 RX ADMIN — MAGNESIUM HYDROXIDE PRN ML: 400 SUSPENSION ORAL at 22:14

## 2017-09-21 RX ADMIN — MORPHINE SULFATE PRN MG: 2 INJECTION, SOLUTION INTRAMUSCULAR; INTRAVENOUS at 18:08

## 2017-09-21 RX ADMIN — MORPHINE SULFATE PRN MG: 2 INJECTION, SOLUTION INTRAMUSCULAR; INTRAVENOUS at 22:14

## 2017-09-21 RX ADMIN — CLOPIDOGREL BISULFATE SCH MG: 75 TABLET, FILM COATED ORAL at 08:03

## 2017-09-21 RX ADMIN — DOCUSATE CALCIUM SCH MG: 240 CAPSULE, LIQUID FILLED ORAL at 22:14

## 2017-09-21 NOTE — PD.VS.PN
Subjective


POD #:  2


Procedure(s):  


Left fem-pop bypass with reversed SVG.


Subjective/Hospital Course


Afebrile 57/M


Pt sitting up in bed eating breakfast 


Pain controlled 


B LE warm w/ motor intact 


 (Vicki Johnson)





Objective


Vitals/I&O











  Date Time  Temp Pulse Resp B/P (MAP) Pulse Ox O2 Delivery O2 Flow Rate FiO2


 


9/21/17 07:52 98.4 79 18 146/83 (104) 94   


 


9/21/17 06:14  82      


 


9/21/17 05:24  79      


 


9/21/17 04:05  102      


 


9/21/17 03:55 99.0 91 17 133/82 (99) 97   


 


9/21/17 03:55  79      


 


9/21/17 01:14  88      


 


9/21/17 00:00  92      


 


9/20/17 23:30 98.7 84 16 116/81 (93) 97   


 


9/20/17 23:00  80      


 


9/20/17 22:00  86      


 


9/20/17 21:00  90      


 


9/20/17 20:35     98   


 


9/20/17 20:00  82      


 


9/20/17 19:15 98.6 80 16 126/67 (86) 98   


 


9/20/17 19:00  85      


 


9/20/17 15:40 98.9 84 17 131/68 (89) 100   


 


9/20/17 14:00  80      


 


9/20/17 13:00  88      


 


9/20/17 12:00  78      


 


9/20/17 11:03 98.6 89 17 134/75 (94) 99   


 


9/20/17 11:00  86      














 9/21/17 9/21/17 9/21/17





 07:00 15:00 23:00


 


Intake Total 960 ml  


 


Output Total 1525 ml  


 


Balance -565 ml  








Exam:


GENERAL: 57/afebrile/W/M /GCS 15 /NAD /A&OX3


SKIN: Warm and dry. Left LE incision intact with surgical glue.  NO D/R/S/ Left 

groin incision intact w/o swelling or hematoma


CARDIOVASCULAR: RRR/ +S1,S2


RESPIRATORY: BS CTA 


GASTROINTESTINAL: 


S/NT- Left sided inguinal hernia noted, hx of 


(Chronic swelling from L pelvic region to left testicle)  w/o change


MUSCULOSKELETAL: No cyanosis, or edema. B LE warm with motor intact


Left DP/PT palpable


Right DP palpable


 (Vicki Johnson)





Assessment and Plan


Assessment:  


(1) Claudication in peripheral vascular disease


Plan


Patient progressing appropriately.


Plan for Mobilization


DC planning back SNF late week.


Yvon Arthur DO, MIGUEL ÁNGEL








POD 2


Pt doing well 





Plan 


Continue PT


Continue pain control 


D/C planning 





Vicki URBINA


[facility]/[place]


[phone]


Discharge Planning


1-2 days 


 (Vicki Johnson)


Plan


I agree with above.


OOB and Ambulate today with possible discharge in the next few days if pain 

controlled.





Yvon Arthur


 (Yvon Arthur DO)











Vicki Johnson Sep 21, 2017 10:38


Yvon Arthur DO Sep 21, 2017 14:01

## 2017-09-22 VITALS
SYSTOLIC BLOOD PRESSURE: 115 MMHG | TEMPERATURE: 98.8 F | HEART RATE: 92 BPM | OXYGEN SATURATION: 98 % | RESPIRATION RATE: 16 BRPM | DIASTOLIC BLOOD PRESSURE: 76 MMHG

## 2017-09-22 VITALS
OXYGEN SATURATION: 97 % | TEMPERATURE: 97.6 F | SYSTOLIC BLOOD PRESSURE: 118 MMHG | HEART RATE: 87 BPM | RESPIRATION RATE: 18 BRPM | DIASTOLIC BLOOD PRESSURE: 73 MMHG

## 2017-09-22 VITALS
OXYGEN SATURATION: 97 % | HEART RATE: 94 BPM | SYSTOLIC BLOOD PRESSURE: 112 MMHG | DIASTOLIC BLOOD PRESSURE: 75 MMHG | TEMPERATURE: 97.9 F | RESPIRATION RATE: 18 BRPM

## 2017-09-22 VITALS — HEART RATE: 90 BPM

## 2017-09-22 VITALS
TEMPERATURE: 98.8 F | HEART RATE: 87 BPM | RESPIRATION RATE: 16 BRPM | SYSTOLIC BLOOD PRESSURE: 115 MMHG | OXYGEN SATURATION: 97 % | DIASTOLIC BLOOD PRESSURE: 68 MMHG

## 2017-09-22 VITALS — HEART RATE: 84 BPM

## 2017-09-22 VITALS
TEMPERATURE: 98.1 F | DIASTOLIC BLOOD PRESSURE: 62 MMHG | OXYGEN SATURATION: 96 % | SYSTOLIC BLOOD PRESSURE: 116 MMHG | HEART RATE: 91 BPM | RESPIRATION RATE: 18 BRPM

## 2017-09-22 VITALS
HEART RATE: 86 BPM | TEMPERATURE: 97.4 F | OXYGEN SATURATION: 96 % | SYSTOLIC BLOOD PRESSURE: 103 MMHG | DIASTOLIC BLOOD PRESSURE: 52 MMHG | RESPIRATION RATE: 18 BRPM

## 2017-09-22 VITALS — HEART RATE: 103 BPM

## 2017-09-22 VITALS — HEART RATE: 85 BPM

## 2017-09-22 VITALS — HEART RATE: 80 BPM

## 2017-09-22 VITALS — HEART RATE: 86 BPM

## 2017-09-22 VITALS — HEART RATE: 77 BPM

## 2017-09-22 VITALS — HEART RATE: 88 BPM

## 2017-09-22 VITALS — HEART RATE: 92 BPM

## 2017-09-22 VITALS — HEART RATE: 94 BPM

## 2017-09-22 VITALS — HEART RATE: 74 BPM

## 2017-09-22 RX ADMIN — MORPHINE SULFATE PRN MG: 2 INJECTION, SOLUTION INTRAMUSCULAR; INTRAVENOUS at 13:00

## 2017-09-22 RX ADMIN — LISINOPRIL SCH MG: 20 TABLET ORAL at 08:56

## 2017-09-22 RX ADMIN — DOCUSATE CALCIUM SCH MG: 240 CAPSULE, LIQUID FILLED ORAL at 21:46

## 2017-09-22 RX ADMIN — MORPHINE SULFATE PRN MG: 2 INJECTION, SOLUTION INTRAMUSCULAR; INTRAVENOUS at 21:49

## 2017-09-22 RX ADMIN — MAGNESIUM HYDROXIDE PRN ML: 400 SUSPENSION ORAL at 17:04

## 2017-09-22 RX ADMIN — PANTOPRAZOLE SCH MG: 40 TABLET, DELAYED RELEASE ORAL at 21:46

## 2017-09-22 RX ADMIN — ATORVASTATIN CALCIUM SCH MG: 40 TABLET, FILM COATED ORAL at 21:46

## 2017-09-22 RX ADMIN — MORPHINE SULFATE PRN MG: 2 INJECTION, SOLUTION INTRAMUSCULAR; INTRAVENOUS at 08:56

## 2017-09-22 RX ADMIN — MORPHINE SULFATE PRN MG: 2 INJECTION, SOLUTION INTRAMUSCULAR; INTRAVENOUS at 17:05

## 2017-09-22 RX ADMIN — ASPIRIN SCH MG: 325 TABLET, DELAYED RELEASE ORAL at 08:55

## 2017-09-22 RX ADMIN — MORPHINE SULFATE PRN MG: 2 INJECTION, SOLUTION INTRAMUSCULAR; INTRAVENOUS at 03:12

## 2017-09-22 RX ADMIN — CLOPIDOGREL BISULFATE SCH MG: 75 TABLET, FILM COATED ORAL at 08:55

## 2017-09-22 NOTE — PD.VS.PN
Subjective


POD #:  3


Procedure(s):  


Left fem-pop bypass with reversed SVG.


Subjective/Hospital Course


Afebrile 57/M


Pt sitting up in bed eating breakfast 


Pain controlled 


B LE warm w/ motor intact 


 (Vicki Johnson)





Objective


Vitals/I&O











  Date Time  Temp Pulse Resp B/P (MAP) Pulse Ox O2 Delivery O2 Flow Rate FiO2


 


9/22/17 09:33   14     


 


9/22/17 09:00  88      


 


9/22/17 08:00  74      


 


9/22/17 07:00 97.4 93 18 103/52 (69) 96   


 


9/22/17 07:00  86      


 


9/22/17 06:00  88      


 


9/22/17 05:00  94      


 


9/22/17 04:00  88      


 


9/22/17 03:00 98.1 91 18 116/62 (80) 96   


 


9/22/17 03:00  91      


 


9/22/17 02:00  84      


 


9/22/17 01:00  84      


 


9/22/17 00:00  85      


 


9/21/17 23:00  82      


 


9/21/17 23:00 99.1 84 18 141/72 (95) 94   


 


9/21/17 22:00  82      


 


9/21/17 21:00  84      


 


9/21/17 20:01 98.1 89 18 117/71 (86) 96   


 


9/21/17 20:00  92      


 


9/21/17 19:00  96      


 


9/21/17 15:19 98.4 91 18 108/68 (81) 96   


 


9/21/17 11:58 98.9 92 18 149/85 (106) 96   














 9/22/17 9/22/17 9/22/17





 07:00 15:00 23:00


 


Intake Total 480 ml  


 


Output Total 350 ml  


 


Balance 130 ml  








Exam:


GENERAL: 57/afebrile/W/M /GCS 15 /NAD /A&OX3


SKIN: Warm and dry. Left LE incision intact with surgical glue.  NO D/R/S/ Left 

groin incision intact w/o swelling or hematoma


CARDIOVASCULAR: RRR/ +S1,S2


RESPIRATORY: BS CTA 


GASTROINTESTINAL: 


S/NT- Left sided inguinal hernia noted, hx of 


(Chronic swelling from L pelvic region to left testicle)  w/o change


MUSCULOSKELETAL: No cyanosis, or edema. B LE warm with motor intact


Left DP/PT palpable


Right DP palpable


 (Vicki Johnson)





Assessment and Plan


Assessment:  


(1) Claudication in peripheral vascular disease


Plan











POD 3 


Pt doing well w/ improved claudication


D/C planning 





Plan


Continue PT/OOB/ Ambulation w/ a walker


pain control 





Vicki URBINA


[facility]/[place]


[phone]


Discharge Planning


1-2 days 


 (Vicki Johnson)


Plan


I agree with above A/P.


Patient does not feel that comfortable to leave yet as he lives by himself.


Will have him stay through the weekend to work on building strength.





RM


 (Yvon Arthur DO)











Vicki Johnson Sep 22, 2017 10:18


Yvon Arthur DO Sep 22, 2017 14:54

## 2017-09-23 VITALS
RESPIRATION RATE: 16 BRPM | SYSTOLIC BLOOD PRESSURE: 123 MMHG | TEMPERATURE: 98.7 F | HEART RATE: 81 BPM | OXYGEN SATURATION: 97 % | DIASTOLIC BLOOD PRESSURE: 70 MMHG

## 2017-09-23 VITALS
SYSTOLIC BLOOD PRESSURE: 139 MMHG | DIASTOLIC BLOOD PRESSURE: 68 MMHG | TEMPERATURE: 98.7 F | OXYGEN SATURATION: 98 % | HEART RATE: 82 BPM | RESPIRATION RATE: 18 BRPM

## 2017-09-23 VITALS
RESPIRATION RATE: 20 BRPM | TEMPERATURE: 97.8 F | OXYGEN SATURATION: 98 % | HEART RATE: 70 BPM | SYSTOLIC BLOOD PRESSURE: 133 MMHG | DIASTOLIC BLOOD PRESSURE: 70 MMHG

## 2017-09-23 VITALS
TEMPERATURE: 98.8 F | DIASTOLIC BLOOD PRESSURE: 73 MMHG | HEART RATE: 88 BPM | OXYGEN SATURATION: 97 % | RESPIRATION RATE: 18 BRPM | SYSTOLIC BLOOD PRESSURE: 121 MMHG

## 2017-09-23 VITALS — HEART RATE: 74 BPM

## 2017-09-23 VITALS — HEART RATE: 80 BPM

## 2017-09-23 VITALS — HEART RATE: 76 BPM

## 2017-09-23 VITALS — HEART RATE: 88 BPM

## 2017-09-23 VITALS — HEART RATE: 75 BPM

## 2017-09-23 VITALS — HEART RATE: 87 BPM

## 2017-09-23 VITALS — HEART RATE: 70 BPM

## 2017-09-23 VITALS — HEART RATE: 78 BPM

## 2017-09-23 VITALS — HEART RATE: 72 BPM

## 2017-09-23 VITALS — HEART RATE: 82 BPM

## 2017-09-23 RX ADMIN — CLOPIDOGREL BISULFATE SCH MG: 75 TABLET, FILM COATED ORAL at 08:03

## 2017-09-23 RX ADMIN — ASPIRIN SCH MG: 325 TABLET, DELAYED RELEASE ORAL at 08:02

## 2017-09-23 RX ADMIN — MORPHINE SULFATE PRN MG: 2 INJECTION, SOLUTION INTRAMUSCULAR; INTRAVENOUS at 15:18

## 2017-09-23 RX ADMIN — ENOXAPARIN SODIUM SCH MG: 40 INJECTION SUBCUTANEOUS at 15:17

## 2017-09-23 RX ADMIN — PANTOPRAZOLE SCH MG: 40 TABLET, DELAYED RELEASE ORAL at 20:33

## 2017-09-23 RX ADMIN — LISINOPRIL SCH MG: 20 TABLET ORAL at 08:03

## 2017-09-23 RX ADMIN — ACETAMINOPHEN AND CODEINE PHOSPHATE PRN TAB: 300; 30 TABLET ORAL at 13:09

## 2017-09-23 RX ADMIN — ACETAMINOPHEN AND CODEINE PHOSPHATE PRN TAB: 300; 30 TABLET ORAL at 20:35

## 2017-09-23 RX ADMIN — MAGNESIUM HYDROXIDE PRN ML: 400 SUSPENSION ORAL at 15:32

## 2017-09-23 RX ADMIN — MORPHINE SULFATE PRN MG: 2 INJECTION, SOLUTION INTRAMUSCULAR; INTRAVENOUS at 20:36

## 2017-09-23 RX ADMIN — ACETAMINOPHEN AND CODEINE PHOSPHATE PRN TAB: 300; 30 TABLET ORAL at 00:06

## 2017-09-23 RX ADMIN — MORPHINE SULFATE PRN MG: 2 INJECTION, SOLUTION INTRAMUSCULAR; INTRAVENOUS at 10:35

## 2017-09-23 RX ADMIN — MORPHINE SULFATE PRN MG: 2 INJECTION, SOLUTION INTRAMUSCULAR; INTRAVENOUS at 04:15

## 2017-09-23 RX ADMIN — ATORVASTATIN CALCIUM SCH MG: 40 TABLET, FILM COATED ORAL at 20:33

## 2017-09-23 RX ADMIN — DOCUSATE CALCIUM SCH MG: 240 CAPSULE, LIQUID FILLED ORAL at 20:33

## 2017-09-23 RX ADMIN — ACETAMINOPHEN AND CODEINE PHOSPHATE PRN TAB: 300; 30 TABLET ORAL at 08:00

## 2017-09-23 NOTE — PD.VS.PN
Subjective


Procedure(s):  


Left fem-pop bypass with reversed SVG.


Subjective/Hospital Course


Afebrile 57/M


Pt sitting up in bed eating breakfast 


Pain controlled 


B LE warm w/ motor intact





Objective


Vitals/I&O











  Date Time  Temp Pulse Resp B/P (MAP) Pulse Ox O2 Delivery O2 Flow Rate FiO2


 


9/23/17 11:00 98.7 82 18 139/68 (91) 98   


 


9/23/17 11:00  82      


 


9/23/17 10:00  78      


 


9/23/17 09:00  76      


 


9/23/17 08:04 97.8 70 20 133/70 (91) 98   


 


9/23/17 08:00  70      


 


9/23/17 07:00  70      


 


9/23/17 03:00  75      


 


9/23/17 02:00  72      


 


9/23/17 01:00  74      


 


9/23/17 00:00  76      


 


9/22/17 23:00 98.8 81 16 115/68 (84) 97   


 


9/22/17 23:00  87      


 


9/22/17 22:00  80      


 


9/22/17 21:00  92      


 


9/22/17 20:00  90      


 


9/22/17 19:33 98.8 92 16 115/76 (89) 98   


 


9/22/17 19:00  103      


 


9/22/17 18:10  84      


 


9/22/17 17:00  77      


 


9/22/17 16:00  86      


 


9/22/17 15:00  87      


 


9/22/17 15:00 97.6 94 18 118/73 (88) 97   














 9/23/17 9/23/17 9/23/17





 07:00 15:00 23:00


 


Intake Total  600 ml 


 


Output Total  1225 ml 


 


Balance  -625 ml 








Exam:


left leg incision intact.


Left leg with some swelling.


Triphaic left DP





Assessment and Plan


Assessment:  


(1) Claudication in peripheral vascular disease


Plan


Patient with some leg pain and swelling today.


Patient made ambulatory 2 days ago but has not been ambulating much.


Physical therapy pending.


Will have patient undergo duplex US left leg..


Discharge Planning


1-2 days











Yvon Arthur DO Sep 23, 2017 14:07

## 2017-09-23 NOTE — RADRPT
EXAM DATE/TIME:  09/23/2017 16:35 

 

[facility] COMPARISON:     

No previous studies available for comparison.

        

 

 

INDICATIONS :                

Left leg swelling. 

            

 

MEDICAL HISTORY :     

Stroke.  Hypercholesterolemia.  Hypertension.  Glasses. Syncope. Arthritis. 

 

SURGICAL HISTORY :     

Arthroscopy.    Lap balloon placement. Knee replacement. 

 

ENCOUNTER:     

Initial

 

ACUITY:     

1 day

 

PAIN SCORE:      

0/10

 

LOCATION:      

Left  leg. 

            

                      

 

TECHNIQUE:     

Venous ultrasound of the leg was performed from the inguinal ligament to the proximal calf.  Real-michael
e, color Doppler and spectral tracing, compression and augmentation techniques were used.  

 

FINDINGS:     

There is normal compressibility of the deep venous system from the inguinal region to the proximal ca
lf.  No echogenic clot is seen in the lumen of the common femoral, femoral, popliteal, and posterior 
tibial veins.  There is a normal response of the venous system to proximal and distal augmentation an
d respiration.  

 

CONCLUSION:     Negative exam with no evidence of deep venous thrombosis. 

 

 

 Raghu Aj MD on September 23, 2017 at 18:10           

Board Certified Radiologist.

 This report was verified electronically.

## 2017-09-24 VITALS — HEART RATE: 78 BPM

## 2017-09-24 VITALS — HEART RATE: 68 BPM

## 2017-09-24 VITALS
DIASTOLIC BLOOD PRESSURE: 71 MMHG | OXYGEN SATURATION: 99 % | HEART RATE: 77 BPM | RESPIRATION RATE: 18 BRPM | TEMPERATURE: 98.2 F | SYSTOLIC BLOOD PRESSURE: 132 MMHG

## 2017-09-24 VITALS
SYSTOLIC BLOOD PRESSURE: 131 MMHG | HEART RATE: 71 BPM | DIASTOLIC BLOOD PRESSURE: 81 MMHG | TEMPERATURE: 98.5 F | OXYGEN SATURATION: 97 % | RESPIRATION RATE: 18 BRPM

## 2017-09-24 VITALS
OXYGEN SATURATION: 98 % | RESPIRATION RATE: 18 BRPM | DIASTOLIC BLOOD PRESSURE: 74 MMHG | HEART RATE: 92 BPM | TEMPERATURE: 97.6 F | SYSTOLIC BLOOD PRESSURE: 132 MMHG

## 2017-09-24 VITALS
DIASTOLIC BLOOD PRESSURE: 70 MMHG | OXYGEN SATURATION: 96 % | SYSTOLIC BLOOD PRESSURE: 128 MMHG | RESPIRATION RATE: 18 BRPM | TEMPERATURE: 98.1 F | HEART RATE: 96 BPM

## 2017-09-24 VITALS
DIASTOLIC BLOOD PRESSURE: 55 MMHG | TEMPERATURE: 98.5 F | OXYGEN SATURATION: 93 % | RESPIRATION RATE: 18 BRPM | SYSTOLIC BLOOD PRESSURE: 114 MMHG | HEART RATE: 76 BPM

## 2017-09-24 VITALS
OXYGEN SATURATION: 97 % | SYSTOLIC BLOOD PRESSURE: 144 MMHG | DIASTOLIC BLOOD PRESSURE: 82 MMHG | TEMPERATURE: 97.9 F | HEART RATE: 86 BPM | RESPIRATION RATE: 16 BRPM

## 2017-09-24 VITALS — HEART RATE: 86 BPM

## 2017-09-24 VITALS — HEART RATE: 74 BPM

## 2017-09-24 VITALS — HEART RATE: 79 BPM

## 2017-09-24 VITALS — HEART RATE: 76 BPM

## 2017-09-24 VITALS
HEART RATE: 75 BPM | TEMPERATURE: 98.7 F | OXYGEN SATURATION: 97 % | SYSTOLIC BLOOD PRESSURE: 122 MMHG | RESPIRATION RATE: 16 BRPM | DIASTOLIC BLOOD PRESSURE: 99 MMHG

## 2017-09-24 VITALS — HEART RATE: 80 BPM

## 2017-09-24 VITALS — HEART RATE: 70 BPM

## 2017-09-24 VITALS — HEART RATE: 82 BPM

## 2017-09-24 VITALS — HEART RATE: 88 BPM

## 2017-09-24 VITALS — HEART RATE: 72 BPM

## 2017-09-24 RX ADMIN — PANTOPRAZOLE SCH MG: 40 TABLET, DELAYED RELEASE ORAL at 21:19

## 2017-09-24 RX ADMIN — ACETAMINOPHEN AND CODEINE PHOSPHATE PRN TAB: 300; 30 TABLET ORAL at 04:30

## 2017-09-24 RX ADMIN — ACETAMINOPHEN AND CODEINE PHOSPHATE PRN TAB: 300; 30 TABLET ORAL at 11:35

## 2017-09-24 RX ADMIN — ENOXAPARIN SODIUM SCH MG: 40 INJECTION SUBCUTANEOUS at 16:00

## 2017-09-24 RX ADMIN — CLOPIDOGREL BISULFATE SCH MG: 75 TABLET, FILM COATED ORAL at 08:06

## 2017-09-24 RX ADMIN — MORPHINE SULFATE PRN MG: 2 INJECTION, SOLUTION INTRAMUSCULAR; INTRAVENOUS at 00:36

## 2017-09-24 RX ADMIN — ENOXAPARIN SODIUM SCH MG: 40 INJECTION SUBCUTANEOUS at 04:29

## 2017-09-24 RX ADMIN — MORPHINE SULFATE PRN MG: 2 INJECTION, SOLUTION INTRAMUSCULAR; INTRAVENOUS at 06:53

## 2017-09-24 RX ADMIN — MORPHINE SULFATE PRN MG: 2 INJECTION, SOLUTION INTRAMUSCULAR; INTRAVENOUS at 21:18

## 2017-09-24 RX ADMIN — ATORVASTATIN CALCIUM SCH MG: 40 TABLET, FILM COATED ORAL at 21:19

## 2017-09-24 RX ADMIN — LISINOPRIL SCH MG: 20 TABLET ORAL at 08:06

## 2017-09-24 RX ADMIN — DOCUSATE CALCIUM SCH MG: 240 CAPSULE, LIQUID FILLED ORAL at 21:18

## 2017-09-24 RX ADMIN — ASPIRIN SCH MG: 325 TABLET, DELAYED RELEASE ORAL at 08:06

## 2017-09-24 RX ADMIN — MORPHINE SULFATE PRN MG: 2 INJECTION, SOLUTION INTRAMUSCULAR; INTRAVENOUS at 16:36

## 2017-09-24 NOTE — PD.VS.PN
Subjective


Procedure(s):  


Left fem-pop bypass with reversed SVG.


Subjective/Hospital Course


patient without complaints.


swelling unchanged left leg.


Negative for DVT.





Objective


Vitals/I&O











  Date Time  Temp Pulse Resp B/P (MAP) Pulse Ox O2 Delivery O2 Flow Rate FiO2


 


9/24/17 07:01  76      


 


9/24/17 06:00  68      


 


9/24/17 05:00  78      


 


9/24/17 04:24 97.9 86 16 144/82 (102) 97   


 


9/24/17 04:00  78      


 


9/24/17 02:00  86      


 


9/24/17 01:00  68      


 


9/24/17 00:44 98.7 75 16 122/99 (107) 97   


 


9/24/17 00:00  68      


 


9/23/17 23:00  76      


 


9/23/17 22:00  80      


 


9/23/17 21:00  82      


 


9/23/17 20:40 98.7 81 16 123/70 (87) 97   


 


9/23/17 20:00  80      


 


9/23/17 19:00  80      


 


9/23/17 18:00  80      


 


9/23/17 17:00  88      


 


9/23/17 16:00  87      


 


9/23/17 15:20 98.8 88 18 121/73 (89) 97   


 


9/23/17 15:18   18     


 


9/23/17 15:00  82      


 


9/23/17 14:00  87      


 


9/23/17 13:00  80      


 


9/23/17 12:00  80      


 


9/23/17 11:00 98.7 82 18 139/68 (91) 98   


 


9/23/17 11:00  82      


 


9/23/17 10:00  78      


 


9/23/17 09:00  76      


 


9/23/17 08:04 97.8 70 20 133/70 (91) 98   














 9/24/17 9/24/17 9/24/17





 07:00 15:00 23:00


 


Intake Total 960 ml  


 


Output Total 1275 ml  


 


Balance -315 ml  








Incisions:


left leg incision intact with warm left foot.





Assessment and Plan


Assessment:  


(1) Claudication in peripheral vascular disease


Plan


Patient with some leg pain and swelling negative for DVT.


\


Continue PT.


Discharge Planning


1-2 days











Yvon Arthur DO Sep 24, 2017 08:03

## 2017-09-25 VITALS
TEMPERATURE: 98.4 F | SYSTOLIC BLOOD PRESSURE: 120 MMHG | OXYGEN SATURATION: 97 % | RESPIRATION RATE: 18 BRPM | HEART RATE: 82 BPM | DIASTOLIC BLOOD PRESSURE: 66 MMHG

## 2017-09-25 VITALS — HEART RATE: 77 BPM

## 2017-09-25 VITALS
HEART RATE: 81 BPM | OXYGEN SATURATION: 97 % | RESPIRATION RATE: 18 BRPM | TEMPERATURE: 98.4 F | SYSTOLIC BLOOD PRESSURE: 124 MMHG | DIASTOLIC BLOOD PRESSURE: 70 MMHG

## 2017-09-25 VITALS — HEART RATE: 86 BPM

## 2017-09-25 VITALS
HEART RATE: 75 BPM | SYSTOLIC BLOOD PRESSURE: 102 MMHG | DIASTOLIC BLOOD PRESSURE: 56 MMHG | RESPIRATION RATE: 18 BRPM | OXYGEN SATURATION: 95 % | TEMPERATURE: 98.3 F

## 2017-09-25 VITALS
DIASTOLIC BLOOD PRESSURE: 77 MMHG | SYSTOLIC BLOOD PRESSURE: 136 MMHG | HEART RATE: 72 BPM | TEMPERATURE: 97.4 F | OXYGEN SATURATION: 96 % | RESPIRATION RATE: 18 BRPM

## 2017-09-25 VITALS
TEMPERATURE: 98.9 F | DIASTOLIC BLOOD PRESSURE: 78 MMHG | HEART RATE: 78 BPM | OXYGEN SATURATION: 85 % | RESPIRATION RATE: 18 BRPM | SYSTOLIC BLOOD PRESSURE: 112 MMHG

## 2017-09-25 VITALS — HEART RATE: 74 BPM

## 2017-09-25 VITALS
HEART RATE: 74 BPM | SYSTOLIC BLOOD PRESSURE: 124 MMHG | OXYGEN SATURATION: 99 % | TEMPERATURE: 97.9 F | DIASTOLIC BLOOD PRESSURE: 74 MMHG | RESPIRATION RATE: 16 BRPM

## 2017-09-25 VITALS — HEART RATE: 84 BPM

## 2017-09-25 VITALS — HEART RATE: 79 BPM

## 2017-09-25 VITALS — HEART RATE: 88 BPM

## 2017-09-25 VITALS — HEART RATE: 83 BPM

## 2017-09-25 VITALS — HEART RATE: 80 BPM

## 2017-09-25 VITALS — HEART RATE: 75 BPM

## 2017-09-25 RX ADMIN — ACETAMINOPHEN AND CODEINE PHOSPHATE PRN TAB: 300; 30 TABLET ORAL at 06:20

## 2017-09-25 RX ADMIN — ACETAMINOPHEN AND CODEINE PHOSPHATE PRN TAB: 300; 30 TABLET ORAL at 00:09

## 2017-09-25 RX ADMIN — MORPHINE SULFATE PRN MG: 2 INJECTION, SOLUTION INTRAMUSCULAR; INTRAVENOUS at 14:17

## 2017-09-25 RX ADMIN — PANTOPRAZOLE SCH MG: 40 TABLET, DELAYED RELEASE ORAL at 21:15

## 2017-09-25 RX ADMIN — DOCUSATE CALCIUM SCH MG: 240 CAPSULE, LIQUID FILLED ORAL at 21:15

## 2017-09-25 RX ADMIN — ATORVASTATIN CALCIUM SCH MG: 40 TABLET, FILM COATED ORAL at 21:15

## 2017-09-25 RX ADMIN — MORPHINE SULFATE PRN MG: 2 INJECTION, SOLUTION INTRAMUSCULAR; INTRAVENOUS at 21:16

## 2017-09-25 RX ADMIN — CLOPIDOGREL BISULFATE SCH MG: 75 TABLET, FILM COATED ORAL at 08:23

## 2017-09-25 RX ADMIN — ASPIRIN SCH MG: 325 TABLET, DELAYED RELEASE ORAL at 08:24

## 2017-09-25 RX ADMIN — ENOXAPARIN SODIUM SCH MG: 40 INJECTION SUBCUTANEOUS at 16:00

## 2017-09-25 RX ADMIN — MORPHINE SULFATE PRN MG: 2 INJECTION, SOLUTION INTRAMUSCULAR; INTRAVENOUS at 08:26

## 2017-09-25 RX ADMIN — MORPHINE SULFATE PRN MG: 2 INJECTION, SOLUTION INTRAMUSCULAR; INTRAVENOUS at 04:00

## 2017-09-25 RX ADMIN — ENOXAPARIN SODIUM SCH MG: 40 INJECTION SUBCUTANEOUS at 04:15

## 2017-09-25 RX ADMIN — LISINOPRIL SCH MG: 20 TABLET ORAL at 08:24

## 2017-09-25 NOTE — PD.VS.PN
Subjective


POD #:  6


Procedure(s):  


Left fem-pop bypass with reversed SVG.


Subjective/Hospital Course


Afebrile


Patient is without complaints.


Negative for DVT


 (Vicki Johnson)





Objective


Vitals/I&O











  Date Time  Temp Pulse Resp B/P (MAP) Pulse Ox O2 Delivery O2 Flow Rate FiO2


 


9/25/17 09:00  84      


 


9/25/17 08:35   16     


 


9/25/17 08:10   16     


 


9/25/17 08:00  79      


 


9/25/17 07:00 97.4 82 18 136/77 (96) 96   


 


9/25/17 07:00  72      


 


9/25/17 06:00  74      


 


9/25/17 05:05  77      


 


9/25/17 04:00  74      


 


9/25/17 04:00 98.3 75 18 102/56 (71) 95   


 


9/25/17 03:00  75      


 


9/25/17 02:00  77      


 


9/25/17 01:00  84      


 


9/25/17 00:00  84      


 


9/24/17 23:00 98.5 84 18 131/81 (98) 97   


 


9/24/17 23:00  71      


 


9/24/17 22:00  79      


 


9/24/17 21:00  80      


 


9/24/17 20:00 97.6 77 18 132/74 (93) 98   


 


9/24/17 20:00  92      


 


9/24/17 19:00  82      


 


9/24/17 18:01  82      


 


9/24/17 17:01  76      


 


9/24/17 16:00  74      


 


9/24/17 15:30 98.5 76 18 114/55 (74) 93   


 


9/24/17 15:00  78      


 


9/24/17 14:00  80      


 


9/24/17 13:01  88      


 


9/24/17 12:00  78      


 


9/24/17 11:30 98.2 77 18 132/71 (91) 99   


 


9/24/17 11:00  80      


 


9/24/17 10:00  72      














 9/25/17 9/25/17 9/25/17





 07:00 15:00 23:00


 


Intake Total 720 ml  


 


Output Total 1325 ml  


 


Balance -605 ml  








Exam:


GENERAL: Afebrile 57/M/A&OX3/GCS15


SKIN: Warm and dry/ L LE warm with motor intact/ LLE incision intact with 

surgical glue w/ erythema along the incision line/ NO D/S or warmth/ L groin 

incision intact w/o R/D/S


CARDIOVASCULAR: RRR


RESPIRATORY: BS CTA 


GASTROINTESTINAL: S/NT 


Palpable L DP


Palpable R DP


 (Vicki Johnson)





Assessment and Plan


Assessment:  


(1) Claudication in peripheral vascular disease


Plan


Patient with some leg pain and swelling negative for DVT.


\


Continue PT.








Pt doing well this am 


Pt reported he has not ambulated much


pain controlled 





Plan


Pt clear for D/C to a Rehabilitative Center for continued PT


Arranged out patient f/u 





Vicki URBINA


[facility]/[place]


[phone]


Discharge Planning


D/C to out patient rehab


 (Vicki Johnson)


Plan


I agree with above assessment and plan.


Yvon Arthur DO, FACS


 (Yvon Arthur DO)











Vicki Johnson Sep 25, 2017 09:42


Yvon Arthur DO Sep 25, 2017 09:58

## 2017-09-25 NOTE — PD.VS.DC
__________________________________________________





Discharge Summary


Admission Date:


Sep 19, 2017 at 08:26


Discharge Date:  Sep 25, 2017


Admission Diagnosis:  


(1) Claudication in peripheral vascular disease


Discharge Diagnosis:  


(1) Claudication in peripheral vascular disease


ICD Codes:  I73.9 - Peripheral vascular disease, unspecified


Brief History from admission


Mr. Alberto is a 57/M c/o worsening L LE claudication


Procedure(s):  


Left fem-pop bypass with reversed SVG.


Significant Findings


GENERAL: Afebrile 57/M/A&OX3/GCS15


SKIN: Warm and dry/ L LE warm with motor intact/ LLE incision intact with 

surgical glue w/ erythema along the incision line/ NO D/S or warmth/ L groin 

incision intact w/o R/D/S


CARDIOVASCULAR: RRR


RESPIRATORY: BS CTA 


GASTROINTESTINAL: S/NT 


Palpable L DP


Palpable R DP


Hospital Course:


Pt has a PMH of PAD with worsening claudication


Pt S/p Left lower extremity distal bypass


Pt w/o complications 


Pt d/c to rehab for optimal healing and continued PT 








Allergies








Coded Allergies Type Severity Reaction Last Updated Verified


 


  cortisone Allergy Severe Rash 9/19/17 Yes








Recent Impressions








Lower Extremity Ultrasound 9/23/17 0000 Signed





Impressions: 





 Service Date/Time:  Saturday, September 23, 2017 16:35 - CONCLUSION: Negative 





 exam with no evidence of deep venous thrombosis.     Raghu Aj MD 














 9/23/17 9/23/17 9/24/17 9/24/17 9/25/17 9/25/17





 06:00 18:00 06:00 18:00 06:00 18:00


 


Intake Total  600 ml 1920 ml 1200 ml 720 ml 


 


Output Total  1225 ml 3425 ml 1350 ml 1325 ml 


 


Balance  -625 ml -1505 ml -150 ml -605 ml 


 


      


 


Intake Oral  600 ml 1920 ml 1200 ml 720 ml 


 


Output Urine Total  1225 ml 3425 ml 1350 ml 1325 ml 


 


# Voids    1  


 


# Bowel Movements  0 0 0  








Orders








Procedure Category Date Status





  Time 


 


Consult Pt Eval & Tx PT 9/22/17 Logged





OOB  14:54 


 


Enoxaparin Inj MED 9/23/17 In Process





 (Lovenox Inj)  16:00 


 


Us Leg Venous Doppler RADUS 9/23/17 Resulted





   


 


^ Other Nursing Orders CALI 9/24/17 In Process





  19:40 


 


Magnesium Hydroxide MED 9/24/17 In Process





Liq (Milk Of Magnesi  23:15 


 


Bisacodyl Supp MED 9/24/17 In Process





 (Dulcolax Supp)  23:30 


 


Attending Discharge DISCHARGE 9/25/17 Transmitted





Order   








Vital Signs








  Date Time  Temp Pulse Resp B/P (MAP) Pulse Ox O2 Delivery O2 Flow Rate FiO2


 


9/25/17 09:00  84      


 


9/25/17 08:35   16     


 


9/25/17 08:10   16     


 


9/25/17 08:00  79      


 


9/25/17 07:00 97.4 82 18 136/77 (96) 96   


 


9/25/17 07:00  72      


 


9/25/17 06:00  74      


 


9/25/17 05:05  77      


 


9/25/17 04:00  74      


 


9/25/17 04:00 98.3 75 18 102/56 (71) 95   


 


9/25/17 03:00  75      


 


9/25/17 02:00  77      


 


9/25/17 01:00  84      


 


9/25/17 00:00  84      


 


9/24/17 23:00 98.5 84 18 131/81 (98) 97   


 


9/24/17 23:00  71      


 


9/24/17 22:00  79      


 


9/24/17 21:00  80      


 


9/24/17 20:00 97.6 77 18 132/74 (93) 98   


 


9/24/17 20:00  92      


 


9/24/17 19:00  82      


 


9/24/17 18:01  82      


 


9/24/17 17:01  76      


 


9/24/17 16:00  74      


 


9/24/17 15:30 98.5 76 18 114/55 (74) 93   


 


9/24/17 15:00  78      


 


9/24/17 14:00  80      


 


9/24/17 13:01  88      


 


9/24/17 12:00  78      


 


9/24/17 11:30 98.2 77 18 132/71 (91) 99   


 


9/24/17 11:00  80      


 


9/24/17 10:00  72      


 


9/24/17 09:00  70      


 


9/24/17 08:01 98.1 96 18 128/70 (89) 96   


 


9/24/17 08:00  78      


 


9/24/17 07:01  76      


 


9/24/17 06:00  68      


 


9/24/17 05:00  78      


 


9/24/17 04:24 97.9 86 16 144/82 (102) 97   


 


9/24/17 04:00  78      


 


9/24/17 02:00  86      


 


9/24/17 01:00  68      


 


9/24/17 00:44 98.7 75 16 122/99 (107) 97   


 


9/24/17 00:00  68      


 


9/23/17 23:00  76      


 


9/23/17 22:00  80      


 


9/23/17 21:00  82      


 


9/23/17 20:40 98.7 81 16 123/70 (87) 97   


 


9/23/17 20:00  80      


 


9/23/17 19:00  80      


 


9/23/17 18:00  80      


 


9/23/17 17:00  88      


 


9/23/17 16:00  87      


 


9/23/17 15:20 98.8 88 18 121/73 (89) 97   


 


9/23/17 15:00  82      


 


9/23/17 14:00  87      


 


9/23/17 13:00  80      


 


9/23/17 12:00  80      


 


9/23/17 11:00 98.7 82 18 139/68 (91) 98   


 


9/23/17 11:00  82      


 


9/23/17 10:00  78      


 


9/23/17 09:00  76      


 


9/23/17 08:04 97.8 70 20 133/70 (91) 98   


 


9/23/17 08:00  70      


 


9/23/17 07:00  70      


 


9/23/17 03:00  75      


 


9/23/17 02:00  72      


 


9/23/17 01:00  74      


 


9/23/17 00:00  76      


 


9/22/17 23:00 98.8 81 16 115/68 (84) 97   


 


9/22/17 23:00  87      


 


9/22/17 22:00  80      


 


9/22/17 21:00  92      


 


9/22/17 20:00  90      


 


9/22/17 19:33 98.8 92 16 115/76 (89) 98   


 


9/22/17 19:00  103      


 


9/22/17 18:10  84      


 


9/22/17 17:00  77      


 


9/22/17 16:00  86      


 


9/22/17 15:00  87      


 


9/22/17 15:00 97.6 94 18 118/73 (88) 97   


 


9/22/17 14:00  86      


 


9/22/17 13:07  86      


 


9/22/17 12:00  86      


 


9/22/17 11:00  87      


 


9/22/17 11:00 97.9 94 18 112/75 (87) 97   


 


9/22/17 10:00  74      








Discharge Condition:  Good


Discharge Disposition:  Discharge to SNF


Discharge Instructions:


Leave incision to L LE/groin open to air


Report and new onset fever,chills, redness, drainage or swelling


May shower then pat dry incision 


NO Tub baths until incision is fully healed 


Do not apply any creams or ointments as it may loosen surgical glue


Call the office with any questions or concerns 





Vicki URBINA


[facility]/[place]


[phone]


Any questions or concerns: Call [facility] at [facility]  [phone]











Vicki Johnson Sep 25, 2017 09:57

## 2017-09-26 VITALS — HEART RATE: 73 BPM

## 2017-09-26 VITALS — HEART RATE: 69 BPM

## 2017-09-26 VITALS — HEART RATE: 74 BPM

## 2017-09-26 VITALS
HEART RATE: 89 BPM | SYSTOLIC BLOOD PRESSURE: 150 MMHG | TEMPERATURE: 97.9 F | OXYGEN SATURATION: 94 % | RESPIRATION RATE: 18 BRPM | DIASTOLIC BLOOD PRESSURE: 90 MMHG

## 2017-09-26 VITALS — HEART RATE: 89 BPM

## 2017-09-26 VITALS
OXYGEN SATURATION: 97 % | DIASTOLIC BLOOD PRESSURE: 64 MMHG | SYSTOLIC BLOOD PRESSURE: 118 MMHG | HEART RATE: 76 BPM | TEMPERATURE: 98.3 F | RESPIRATION RATE: 18 BRPM

## 2017-09-26 VITALS
SYSTOLIC BLOOD PRESSURE: 116 MMHG | DIASTOLIC BLOOD PRESSURE: 70 MMHG | RESPIRATION RATE: 18 BRPM | OXYGEN SATURATION: 96 % | HEART RATE: 73 BPM | TEMPERATURE: 98.5 F

## 2017-09-26 VITALS — HEART RATE: 80 BPM

## 2017-09-26 VITALS
TEMPERATURE: 98 F | RESPIRATION RATE: 18 BRPM | SYSTOLIC BLOOD PRESSURE: 143 MMHG | DIASTOLIC BLOOD PRESSURE: 76 MMHG | HEART RATE: 77 BPM

## 2017-09-26 VITALS — HEART RATE: 77 BPM

## 2017-09-26 VITALS — HEART RATE: 72 BPM

## 2017-09-26 VITALS — HEART RATE: 87 BPM

## 2017-09-26 VITALS — HEART RATE: 68 BPM

## 2017-09-26 VITALS — HEART RATE: 86 BPM

## 2017-09-26 VITALS — HEART RATE: 79 BPM

## 2017-09-26 RX ADMIN — CLOPIDOGREL BISULFATE SCH MG: 75 TABLET, FILM COATED ORAL at 08:25

## 2017-09-26 RX ADMIN — MORPHINE SULFATE PRN MG: 2 INJECTION, SOLUTION INTRAMUSCULAR; INTRAVENOUS at 08:24

## 2017-09-26 RX ADMIN — LISINOPRIL SCH MG: 20 TABLET ORAL at 08:25

## 2017-09-26 RX ADMIN — ASPIRIN SCH MG: 325 TABLET, DELAYED RELEASE ORAL at 08:25

## 2017-09-26 RX ADMIN — ENOXAPARIN SODIUM SCH MG: 40 INJECTION SUBCUTANEOUS at 03:57

## 2017-09-26 RX ADMIN — ENOXAPARIN SODIUM SCH MG: 40 INJECTION SUBCUTANEOUS at 16:00

## 2017-09-26 RX ADMIN — ACETAMINOPHEN AND CODEINE PHOSPHATE PRN TAB: 300; 30 TABLET ORAL at 13:49

## 2017-09-26 RX ADMIN — ACETAMINOPHEN AND CODEINE PHOSPHATE PRN TAB: 300; 30 TABLET ORAL at 18:13

## 2017-09-26 NOTE — PD.VS.PN
Subjective


POD #:  7


Procedure(s):  


Left fem-pop bypass with reversed SVG.


Subjective/Hospital Course


Afebrile


Patient is without complaints.


 (Vicki Johnson)





Objective


Vitals/I&O











  Date Time  Temp Pulse Resp B/P (MAP) Pulse Ox O2 Delivery O2 Flow Rate FiO2


 


9/26/17 12:00  80      


 


9/26/17 11:00  77      


 


9/26/17 11:00 98.3 76 18 118/64 (82) 97   


 


9/26/17 10:00  74      


 


9/26/17 09:05   15     


 


9/26/17 09:00  79      


 


9/26/17 08:00  77      


 


9/26/17 07:00 98.0 84 18 143/76 (98)    


 


9/26/17 07:00  77      


 


9/26/17 06:00  72      


 


9/26/17 05:00  72      


 


9/26/17 04:00  68      


 


9/26/17 03:00  73      


 


9/26/17 03:00 98.5 67 18 116/70 (85) 96   


 


9/26/17 02:00  73      


 


9/26/17 01:00  69      


 


9/26/17 00:00  80      


 


9/25/17 23:00  76      


 


9/25/17 23:00 98.4 82 18 120/66 (84) 97   


 


9/25/17 22:00  77      


 


9/25/17 21:00  88      


 


9/25/17 20:00  83      


 


9/25/17 19:00 98.4 83 18 124/70 (88) 97   


 


9/25/17 19:00  81      


 


9/25/17 18:26  77      


 


9/25/17 17:00  79      


 


9/25/17 16:00  88      


 


9/25/17 15:00  79      


 


9/25/17 15:00 98.9 78 18 112/78 (89) 85   


 


9/25/17 14:33  74      














 9/26/17 9/26/17 9/26/17





 07:00 15:00 23:00


 


Intake Total 380 ml  


 


Output Total 875 ml  


 


Balance -495 ml  








Exam:


GENERAL: Alert in nad


SKIN: 


Warm and dry


Increased Erythema along Left LE incision line NO D/S/O present 








 (Vicki Johnson)





Assessment and Plan


Assessment:  


(1) Claudication in peripheral vascular disease


Plan











POD 7 pt doing well


Pt awaits SNF placement 


Increased redness along the incision line present 


Antibiotic therapy started 





Plan


Pt awaits SNF placement


Walker ordered


Arranged post op F/U





Vicki URBINA


[facility]/[place]


[phone]


Discharge Planning


D/C to out patient rehab


 (Vicki Johnson)


Plan


I agree with above.


Plan for rehabilitation.


Yvon Arthur DO, FACS


 (Yvon Arthur DO)











Vicki Johnson Sep 26, 2017 13:02


Yvon Arthur DO Sep 26, 2017 15:32

## 2018-02-13 ENCOUNTER — HOSPITAL ENCOUNTER (OUTPATIENT)
Dept: HOSPITAL 17 - HDOC | Age: 58
Discharge: HOME | End: 2018-02-13
Attending: SURGERY
Payer: MEDICAID

## 2018-02-13 VITALS
RESPIRATION RATE: 18 BRPM | HEART RATE: 82 BPM | DIASTOLIC BLOOD PRESSURE: 98 MMHG | SYSTOLIC BLOOD PRESSURE: 170 MMHG | TEMPERATURE: 97.6 F | OXYGEN SATURATION: 97 %

## 2018-02-13 VITALS — WEIGHT: 191.36 LBS | BODY MASS INDEX: 25.36 KG/M2 | HEIGHT: 73 IN

## 2018-02-13 DIAGNOSIS — I73.9: ICD-10-CM

## 2018-02-13 DIAGNOSIS — T82.898A: Primary | ICD-10-CM

## 2018-02-13 LAB
BUN SERPL-MCNC: 21 MG/DL (ref 7–18)
CALCIUM SERPL-MCNC: 9.3 MG/DL (ref 8.5–10.1)
CHLORIDE SERPL-SCNC: 103 MEQ/L (ref 98–107)
CREAT SERPL-MCNC: 1.32 MG/DL (ref 0.6–1.3)
GFR SERPLBLD BASED ON 1.73 SQ M-ARVRAT: 56 ML/MIN (ref 89–?)
GLUCOSE SERPL-MCNC: 87 MG/DL (ref 74–106)
HCO3 BLD-SCNC: 29.9 MEQ/L (ref 21–32)
SODIUM SERPL-SCNC: 138 MEQ/L (ref 136–145)

## 2018-02-13 PROCEDURE — 99153 MOD SED SAME PHYS/QHP EA: CPT

## 2018-02-13 PROCEDURE — 37226: CPT

## 2018-02-13 PROCEDURE — 99152 MOD SED SAME PHYS/QHP 5/>YRS: CPT

## 2018-02-13 PROCEDURE — 80048 BASIC METABOLIC PNL TOTAL CA: CPT

## 2018-02-13 PROCEDURE — 75625 CONTRAST EXAM ABDOMINL AORTA: CPT

## 2018-02-13 PROCEDURE — C1760 CLOSURE DEV, VASC: HCPCS

## 2018-02-13 PROCEDURE — C1876 STENT, NON-COA/NON-COV W/DEL: HCPCS

## 2018-02-13 PROCEDURE — C1725 CATH, TRANSLUMIN NON-LASER: HCPCS

## 2018-02-13 PROCEDURE — C1769 GUIDE WIRE: HCPCS

## 2018-02-13 PROCEDURE — G0269 OCCLUSIVE DEVICE IN VEIN ART: HCPCS

## 2018-02-13 PROCEDURE — 75710 ARTERY X-RAYS ARM/LEG: CPT

## 2018-02-13 PROCEDURE — C1893 INTRO/SHEATH, FIXED,NON-PEEL: HCPCS

## 2018-02-13 NOTE — PD.VS.PN
Pre-operative Note


Pre-operative diagnosis:


Failing L LE bypass, PAD


Planned procedure:


Aortogram w/ L LE angiogram, possible endovascular intervention


Interval History:


Pt has had L LE pain for 2 days, motor intact


Labs:





 Laboratory Results








Test


  2/13/18


11:42


 


Anion Gap 5 MEQ/L (5-15) 


 


Blood Urea Nitrogen


  21 MG/DL


(7-18)


 


Creatinine


  1.32 MG/DL


(0.60-1.30)


 


Random Glucose


  87 MG/DL


()


 


Calcium Level


  9.3 MG/DL


(8.5-10.1)


 


Sodium Level


  138 MEQ/L


(136-145)


 


Potassium Level


  4.1 MEQ/L


(3.5-5.1)


 


Chloride Level


  103 MEQ/L


()


 


Carbon Dioxide Level


  29.9 MEQ/L


(21.0-32.0)








Blood:


none needed


Imaging:


will make in OR


Orders:


NPO


Post-operative destination:


DOCU


Operative site marked:  Yes


Consent:


Informed consent has been obtained from Raghu Alberto. I have 

explained the procedure in detail and discussed the risks, benefits, and 

potential complications. All questions have been answered.











German Smith MD Feb 13, 2018 12:41

## 2018-02-13 NOTE — HHI.PR
cc:   German Smith MD


__________________________________________________





Immediate Post Op Note


Procedure Date:


Feb 13, 2018


Pre Op Diagnosis:  


Failing L LE bypass, PAD


Post Op Diagnosis:  


PAD, occluded bypass


Surgeon:


German Smith


Assistant(s):


none


Procedure:


Aortogram w/ L LE angiogram


L SFA PTA/stent (7x60 [place])


R CFA Angioseal


Findings:


occluded old bypass


patent native vessels with mid-SFA stenosis, treated with PTA/stent


Complications:


none


Specimen(s) removed:


none


Estimated blood loss:


10mL


Anesthesia:  MAC


Drains:  None


Patient to:  Other (DOCU)


Patient Condition:  Good


Implant/Devices:  SEE IMPLANT LOG (if applicable)


Date/Time of Procedure:  SEE SURGICAL CARE RECORD











German Smith MD Feb 13, 2018 13:34

## 2018-02-13 NOTE — CATHPROC
[employer] HIS Report

Study Information

Study Number    Admission           Scheduled Start             Study Start

 

[id number]    Feb 13 2018 11:18AM      02/13/2018 Feb 13 2018 12:30PM

 

Universal Service

 

Cath Endovascular Study

 

Admit Source                Facility Department

 

Other                   [facility] - Cath Lab

 

Physician and Clinical Staff

Initial MD Smith, German           Circnohemy Rodrigues RN, Taylor De La Cruz,JOSE

 

                          Recorder      Rosalinda Wang,RT(R) (BS)

 

                          Recorder      Rosario Carnes ,RT(R)

 

                          Scrub        Dorian Horvath,RT(R)

 

Procedures Performed

Procedure                     Location (Site)            Vessel Name

 

Abdominal Angiogram                 Abd Aorta (A3)            Aorta

Abdominal Angiogram                 Fem Art (left)            Femoral Art

Periph stent                    SFA (right)              Femoral Art

PTA                        SFA (right)              Femoral Art

Wire insertion                   Fem Art (right)           Femoral Art

Equipment

Time            Description               Size         Mfg Part Number  Used/Scraped

                                                   [id number]

12:39    [employer]IO[employer]       OMNI FLUSH 65CM CATHETER        FR 4                  Used

                                                   *[id number]

                    INTRODUCER SET,

12:39    [employer]                              FR 5         [id number] *[id number] Used

                    MICROPUNCTURE, STIFFENED

                    SHEATH, FR6 BAYRON 1 FLEXOR                  [id number]

13:03    COOK/ANTONIO                              FR 6                  Used

                    55CM                             *[id number]

14:54    COOK/ANTONIO          STENT, 7 X 60 ZILVER 125CM       7 x 60        [id number] Used

                                                   [id number]

13:24    [employer] ANGIOSEAL, FR6 VIP              FR 6                  Used

                                                   *[id number]

                                                   [id number]

12:39    [employer]     PACK, CCL CUSTOM            *                   Used

                                                   *[id number]

12:39    [employer]        PRESSURE TUBING 48"           48"         [id number]     Used

                                                   [id number]

13:06    [employer]        SHEATH, FR6.5 PRELUDE 11CM       FR 6.5        038ACT      Used

                                                   *[id number]

                                                   [id number]

13:03    [employer]        WIRE, YOU 260CM .035         260CM                 Used

                                                   *[id number]

                                                   [id number]

12:39    NAMIC            TUBING, HIGH PRESSURE 20"        20"                  Used

                                                   *[id number]

                    TUBING, PRESSURE INJECTION                  [id number]

12:39    NAMIC PACER                             72"                  Used

                    72"                             *[id number]

12:39    [employer]           OMNIPAQUE, 300 MG, 150ML        150ML        [id number]      Used

 

12:39    [employer]           OMNIPAQUE, 300 MG, 50ML         50ML         [id number]      Used

                                                   [id number]

12:39    [employer]        BLANKET,WARM AIR CCL          *                   Used

                                                   *[id number]

                                                   [id number]

12:39    [employer]       SHEATH, FR4 TERUMO (10CM)        FR 4                  Used

                                                   *[id number]

                    WIRE, ANGLED GLIDE .035                   [id number]

12:39    [employer]/ANTONIO                         260CM                 Used

                    260CM                            *[id number]

 

Equipment Model, Serial, Lot Number and Expiration Data

Description               Model Number         Serial Number       Lot Number       Expiration Date

 

ANGIOSEAL, FR6 VIP                                       [id number]        10-

SHEATH, FR6 BAYRON 1 FLEXOR

                                                [id number]         11-

55CM

STENT, 7 X 60 ZILVER 125CM       [id number]                    [id number]        09-

 

 

History: Allergies

Allergy               Reaction

 

cortisone              Rash

 

 

History: Risk Factors

Hypertension    Dyslipidemia

 

Yes         Yes

Cerebrovascular     Peripheral Artery    Chronic Lung

                                   Diabetes

Disease         Disease         Disease

 

 

Labs

Glucose (mg/dl)     BUN (mg/dl)       Creatinine (mg/dl)   BUN:Creatinine (1:x)

74..00      7.00-18.00       0.50-1.30        10.00-20.00

 

87           21           1.3           16.2

Na (meq/l)        K (meq/l)

 

136..00      3.50-5.10

 

138           4.1

 

 

 

 

Medication

Medication Total Dose (Bolus/Oral)

Medication              Total Dosage/Unit

 

1% XYLOCAINE                20 mL

 

FENTANYL                  100 mcg

 

HEPARIN                 5000 units

 

VERSED                    4 mg

 

Medications (Bolus/Oral)

Medication           Time Given          Dosage/Unit      Administered By       Reason

 

FENTANYL            2/13/2018 12:31:00 PM     50 mcg        Tucker Rodrigues RN

50 mcg FENTANYL given in lab by Tucker Rodrigues RN in Left Antecubital via Peripheral IV. Ordered by German Espino.

 

VERSED             2/13/2018 12:32:00 PM      2 mg        Tucker Rodrigues RN

2 mg VERSED given in lab by Tucker Rodrigues RN in Left Antecubital via Peripheral IV. Ordered by German Smith.

 

FENTANYL            2/13/2018 12:40:00 PM     50 mcg        Tucker Rodrigues RN

50 mcg FENTANYL given in lab by Tucker Rodrigues RN in Left Antecubital via Peripheral IV. Ordered by German Espino.

 

VERSED             2/13/2018 12:41:00 PM      2 mg        Tucker Rodrigues RN

2 mg VERSED given in lab by Tucker Rodrigues RN in Left Antecubital via Peripheral IV. Ordered by German Smith.

 

1% XYLOCAINE          2/13/2018 12:53:48 PM     20 mL        German Smith

20 mL 1% XYLOCAINE given in lab by German Smith in Right Groin via Subcutaneous. Ordered by German Smith.

 

HEPARIN            2/13/2018 1:04:42 PM     5000 units       Taylor Cadena

5000 units HEPARIN given in lab by Taylor Cadena, RN in Left Antecubital via Peripheral IV. Ordered
 by German Smith.

 

Medication (Drip)

Medication           Time Given          Dosage/Unit      Concentration/Unit  Diluent (ml)   Solution


 

ANCEF             2/13/2018 1:16:09 PM      2 g

2 g ANCEF given in lab by Taylor Cadena, RN in Right Antecubital via Peripheral IV. Ordered by German Darling.

 

IV Solutions          2/13/2018 12:29:56 PM     50 mL (IV)                         NaCl .9

Patient arrived on IV Solutions via Peripheral IV. Pump/Drip Flow using NaCl .9.

Initial Case Assessment

Cardiovascular

HR           NIBP           Chest Pain

 

81           188/112          0

 

Edema Present      Skin color           Skin

 

None           Normal             Warm Dry

 

Neurological State

            Oriented to time-place-

Alert                      Moves all extremities

            person

 

Respiration - General

Respiration Rate

            SpO2 (%)

(B/min)

14           98

 

Final Case Assessment

Cardiovascular

HR           NIBP           Chest Pain

 

80           159/91          0

 

Edema Present      Skin color           Skin

 

None           Normal             Warm Dry

 

Neurological State

            Oriented to time-place-

Alert                      Moves all extremities

            person

 

Respiration - General

Respiration Rate

            SpO2 (%)

(B/min)

9            99

 

Chronological Log

Time    Study Chronological Log

 

12:29:44  Patient arrived via Bed.

 

12:29:45  Patient Name, D.O.B, / Armband Verified By R.N.

 

12:29:45  Consent signed by the physician and the patient and verified by the Cath Lab staff.

 

12:29:47  Verbal Stimulation=2 Physical Stimulation=2 Airway=2 Respiration=2 TOTAL=8. (0=absent, 1=li
mited, 2=present)

 

12:29:50  Patient has been NPO for More than 6Hrs.

 

12:29:51  Skin Breakdown- none per patient

 

12:29:54  Elliot Prominences Protected

 

12:29:55  A # 20 IV was noted in the Antecubital (left). Grade = 0

 

12:29:56  Patient arrived on IV Solutions via Peripheral IV. Pump/Drip Flow using NaCl .9.

 

12:29:58  History and physical on the chart or being dictated.

      Assessment: Initial Case, HR=81 BPM, CJIT=332/112 mmhg, Chest Pain=0, Edema=None, Color=Normal,
 Skin =

      Warm, Dry

12:29:59

      Neurological: State=Alert, Ox3, MAZA

      Respiration: Resp=14 B/min, SpO2=98 %

12:31:00  50 mcg FENTANYL given in lab by Tucker Rodrigues RN in Left Antecubital via Peripheral IV. Orde
red by German Smith.

 

12:32:00  2 mg VERSED given in lab by Tucker Rodrigues RN in Left Antecubital via Peripheral IV. Ordered 
by German Smith.

12:37:48  MD arrived.

      Vitals capture started with the following parameters, Patient=Adult, Interval=5 min, Initial Pr
ypdlef=726 mmHg,

12:38:16

      Deflation Rate=5 mmHg, Cuff placed on Right Ankle

12:39:35  HR=87 bpm, AOFF=399/107 mmhg, FyK1=981.0 %, Resp=19 B/min, Pain=0, Jada=2, Simms=2

 

12:40:00  50 mcg FENTANYL given in lab by Tucker Rodrigues RN in Left Antecubital via Peripheral IV. Orde
red by German Smith.

 

12:41:00  2 mg VERSED given in lab by Tcuker Rodrigues RN in Left Antecubital via Peripheral IV. Ordered 
by German Smith.

 

12:44:39  HR=82 bpm, UTAP=034/112 mmhg, SpO2=99.0 %, Resp=14 B/min, Pain=0, Simms=2

 

12:48:52  Bilateral groins prepped with 2% chlorhexidine, and draped after a 3 minute waiting time.

 

12:48:59  HR=79 bpm, USXK=568/104 mmhg, SpO2=96.0 %, Resp=12 B/min, Pain=0, Jada=2, Simms=2

      Time Out. Correct patient, correct procedure, correct physician, power injector loaded, with co
ntrast with surgical team

12:50:00

      present. Time Out Concurred by MD and individual staff in procedure.

12:51:54  Case Start

 

12:53:48  20 mL 1% XYLOCAINE given in lab by German Smith in Right Groin via Subcutaneous. Ordered 
by German Smith.

 

12:53:51  Access site was Right Femoral Artery.

      A INTRODUCER SET, MICROPUNCTURE, STIFFENED FR 5 was advanced into the Fem Art (right) using the


12:53:56

      Percutaneous technique.

12:53:58  HR=80 bpm, ZBAJ=047/95 mmhg, SpO2=96.0 %, Resp=11 B/min, Pain=0, Simms=2

      A SHEATH, FR4 TERUMO (10CM) FR 4 was exchanged in the Fem Art (right). This was necessary in or
cinthia to

12:54:12

      accomodate a larger catheter.

      A OMNI FLUSH 65CM CATHETER FR 4 was advanced over a wire. OMNIPAQUE, 300 MG, 150ML 150ML was us
ed for

12:55:24

      injections.

12:56:21  Reference ECG taken

 

12:57:04  Through a OMNI FLUSH 65CM CATHETER FR 4, The Abdominal Aorta was injected with 10 cc's of c
ontrast.

 

12:57:51  A WIRE, ANGLED GLIDE .035 260CM 260CM was inserted via Fem Art (right).

 

12:58:59  HR=81 bpm, DZFL=342/92 mmhg, SpO2=95.0 %, Resp=9 B/min, Pain=0, Simms=2

      Through a OMNI FLUSH 65CM CATHETER FR 4, The Abdominal Aorta was injected with 8 cc's of contra
st. Injection

13:00:32

      continued down the leg.

13:03:56  HR=80 bpm, BBSW=023/89 mmhg, SpO2=97.0 %, Resp=11 B/min, Pain=0, Simms=2

      A SHEATH, FR6.5 PRELUDE 11CM FR 6.5 was exchanged in the Fem Art (right). This was necessary in
 order to

13:04:09

      accomodate a larger catheter.

      5000 units HEPARIN given in lab by Taylor Cadena RN in Left Antecubital via Peripheral IV. O
rdered by Sarah,

13:04:42

      German.

      A SHEATH, FR6 BAYRON 1 FLEXOR 55CM FR 6 was exchanged in the Fem Art (right). This was necessary
 in order to

13:06:20

      accomodate a larger catheter.

13:08:55  HR=81 bpm, JEUS=577/85 mmhg, SpO2=97.0 %, Resp=13 B/min, Pain=0, Simms=2

 

13:10:52  A Cook 6mm x 4 cm advance balloons was inserted over WIRE, YOU 260CM .035 260CM via the F
em Art (right).

 

13:11:44  In the SFA (right) a balloons was inflated to 10 atms for 120 seconds.

 

13:12:37  Balloon Removed.

 

13:13:56  HR=81 bpm, ZMGZ=158/87 mmhg, SpO2=98.0 %, Resp=12 B/min, Pain=0, Simms=2

 

13:16:09  2 g ANCEF given in lab by Taylor Cadena, RN in Right Antecubital via Peripheral IV. Order
ed by German Smith.

      A implantable Zilver 635 Vascular Stent. 7 mm x 60 mm was advanced through a OMNI FLUSH 65CM CA
THETER FR 4

13:18:00

      over a WIRE, YOU 260CM .035 260CM.

13:18:20  A implantable was deployed in the SFA (right).

 

13:18:53  HR=84 bpm, DXSP=482/91 mmhg, SpO2=98.0 %, Resp=12 B/min, Pain=0, Simms=2

 

13:22:22  A balloons was inserted over WIRE, YOU 260CM .035 260CM via the Fem Art (right).

 

13:22:25  In the SFA (right) a balloons was inflated to ~CHOCO~ atms for ~SECONDS~ seconds.

13:23:56   HR=81 bpm, LCSI=535/91 mmhg, SpO2=99.0 %, Resp=12 B/min, Pain=0, Simms=2

 

13:25:12   Balloon Removed.

 

13:25:44   ANGIOSEAL, FR6 VIP FR 6 placement in the Fem Art (right)

 

13:26:22   Case End

       Assessment: Final Case, HR=80 BPM, IZDL=839/91 mmhg, Chest Pain=0, Edema=None, Color=Normal, S
kin = Warm,

       Dry

13:26:26

       Neurological: State=Alert, Ox3, MAZA

       Respiration: Resp=9 B/min, SpO2=99 %

13:26:57   Sterile dressing applied to site

 

13:26:57   No case complications noted.

 

13:26:59   Cine recording checked.

 

13:27:05   Bedside Report will be given.

 

13:27:06   Contrast Scanned

 

13:28:59   HR=81 bpm, XUOT=137/91 mmhg, BkM8=623.0 %, Resp=13 B/min, Pain=0, Simms=2

 

13:31:27   Vitals capture stopped.

 

13:32:40   DOCU Called. Spoke to Paul.

 

 

 

 

End Study - Contrast Media Used In Study

Contrast        Total Opened (mL)     Total Used (mL)     Total Wasted (mL)

 

Omnipaque       45            45            0

 

End Study - Maximum Contrast Load

Max Contrast Load (mL)

 

333.9

 

End Study - Radiation Exposure

Fluoro Time

(minutes)

5.2

 

End Study - Sheaths

Sheaths Pulled By                Sheath Hold Time (min)

 

German Smith

 

 

End Study - Patient Disposition

Complications     Transferred To          Interventional Outcome

 

No           Telemetry Bed          successful

## 2018-02-14 NOTE — MP
cc:

SUSANA SMITH MD

****

 

 

DATE OF SURGERY

2/13/2018

 

PREOPERATIVE DIAGNOSIS

Failing left lower extremity bypass.

 

POSTOPERATIVE DIAGNOSIS

Failed left lower exam bypass, peripheral arterial occlusive disease.

 

PROCEDURE

1.   Aortogram with left lower extremity angiogram.

2. Left SFA angioplasty and stent.

3. Right common femoral Angio-Seal.

 

ATTENDING PHYSICIAN

Susana Smith MD

 

ASSISTANT

None

 

ANESTHESIA

Local with sedation

 

INDICATION

Mr. Alberto is a 57-year-old gentleman with peripheral arterial occlusive

disease.  He had a left lower exam bypass done elsewhere and this was noted to

have severely diminished velocities on a routine graft scan.  He is taken to

the operating room for intraoperative angiographic evaluation and potential

treatment.  Intraoperatively, it was found that he had an occluded bypass graft

and his native SFA was then treated endovascular with an angioplasty and stent.

There was no prior catheter imaging available for my review.

 

DESCRIPTION OF THE PROCEDURE

Informed consent was obtained from the patient and he was taken to the

operating room and placed supine on the operating room table and appropriate

time-out was taken to ensure the patient's identity, operative site and planned

procedure.  The administration of 2 grams of Ancef was initiated prior to stent

implantation and will be discontinued after a single preoperative dose.

Everyone in the room agreed with the time out and we proceeded.  His bilateral

groins were prepped and draped and the right groin was anesthetized with 1%

lidocaine.

 

A 21 gauge micropuncture needle was used to access the right common femoral

artery.  This was exchanged using Seldinger technique for micropuncture sheath

through which a 0.035 Glidewire was introduced.  The micropuncture sheath was

exchanged for a 5-Vatican citizen sheath and a VCF catheter was placed over the wire

into the sheath.  An aortogram and pelvic arteriogram was obtained.  The

Glidewire was reintroduced and navigated down to the left common femoral artery

and the VCF catheter was advanced over this.  A left lower extremity

arteriogram was obtained. The patient was systemically heparinized with 5000 of

IV heparin.  A 0.035 Campos wire was introduced and passed down the popliteal

artery and the VCF catheter and 5-Vatican citizen sheath were removed and a 6-Vatican citizen, 55

cm Fish sheath was introduced.  The Campos wire and CXI catheter was then

advanced down to the popliteal artery and the SFA was angioplastied with a 6-mm

balloon.  The completion angiogram showed residual stenosis and this was

treated with a 7 x 60 Zilver self-expanding stent which was post dilated at 6

mm.  Completion angiogram showed excellent result without any recoil

extravasation.

 

The wire, catheter and sheath were removed and the groin was closed with

Angio-Seal.  There were no complications.  I was present and scrubbed for the

entire procedure.

 

INTERPRETATION OF IMAGES

The patient has a patent terminal aorta, common iliac arteries, hypogastric

arteries and external iliac arteries bilaterally.  There is no hemodynamically

significant stenoses in any of these vessels.  The left common femoral artery

is patent.  The profunda is patent.  The SFA is patent.  There is no

opacification of the femoral popliteal bypass.  The SFA is patent down to the

mid SFA at which point, there is about a 70% stenosis.  The distal SFA is

patent.  The popliteal artery is patent.  There appears to be three-vessel

runoff down to the mid calf.  After angioplasty of the mid SFA, there was

residual stenosis and this was treated with a 7 x 60 self-expanding stent after

deployment of which there was excellent opacification distally without any

embolic complications, recoil or extravasation.

 

 

 

                              _________________________________

                              MD NIURKA Deng/OCTAVIA

D:  2/14/2018/7:13 AM

T:  2/14/2018/9:44 AM

Visit #:  [id number]

Job #:  [id number]

ANA LAURA